# Patient Record
Sex: FEMALE | Race: WHITE | NOT HISPANIC OR LATINO | Employment: UNEMPLOYED | URBAN - METROPOLITAN AREA
[De-identification: names, ages, dates, MRNs, and addresses within clinical notes are randomized per-mention and may not be internally consistent; named-entity substitution may affect disease eponyms.]

---

## 2019-06-26 ENCOUNTER — OFFICE VISIT (OUTPATIENT)
Dept: NEUROSURGERY | Facility: CLINIC | Age: 54
End: 2019-06-26

## 2019-06-26 VITALS
RESPIRATION RATE: 16 BRPM | HEIGHT: 68 IN | WEIGHT: 234 LBS | BODY MASS INDEX: 35.46 KG/M2 | HEART RATE: 68 BPM | DIASTOLIC BLOOD PRESSURE: 90 MMHG | SYSTOLIC BLOOD PRESSURE: 140 MMHG

## 2019-06-26 DIAGNOSIS — M54.9 MID BACK PAIN: Primary | ICD-10-CM

## 2019-06-26 PROCEDURE — 99204 OFFICE O/P NEW MOD 45 MIN: CPT | Performed by: NURSE PRACTITIONER

## 2019-06-26 RX ORDER — IBUPROFEN 800 MG/1
800 TABLET ORAL
COMMUNITY
Start: 2018-10-16 | End: 2019-12-17

## 2019-06-26 RX ORDER — METHOCARBAMOL 500 MG/1
500 TABLET, FILM COATED ORAL 3 TIMES DAILY PRN
Qty: 60 TABLET | Refills: 0 | Status: SHIPPED | OUTPATIENT
Start: 2019-06-26 | End: 2019-09-06

## 2019-06-26 RX ORDER — ATORVASTATIN CALCIUM 20 MG/1
TABLET, FILM COATED ORAL
COMMUNITY
Start: 2019-05-12 | End: 2019-08-22 | Stop reason: SDUPTHER

## 2019-06-26 RX ORDER — VALSARTAN AND HYDROCHLOROTHIAZIDE 320; 25 MG/1; MG/1
1 TABLET, FILM COATED ORAL DAILY
COMMUNITY
Start: 2015-09-09 | End: 2019-12-17 | Stop reason: SDUPTHER

## 2019-06-26 RX ORDER — AMLODIPINE BESYLATE 5 MG/1
5 TABLET ORAL DAILY
COMMUNITY
End: 2020-07-23 | Stop reason: SDUPTHER

## 2019-06-26 RX ORDER — EMPAGLIFLOZIN 25 MG/1
25 TABLET, FILM COATED ORAL DAILY
COMMUNITY
Start: 2019-06-07 | End: 2020-03-09 | Stop reason: SDUPTHER

## 2019-06-26 NOTE — PROGRESS NOTES
"Subjective   Patient ID: Skylar Paige is a 53 y.o. female is being seen for consultation today at the request of Skylar Freitas MD for Thor pain, right side weakness.  Pt is accompanied by her .    History of Present Illness     She presents to the office today for further evaluation of thoracic pain with right-sided weakness.  She has had thoracic MRI imaging at [facility] ([place], [place]) on January 25, 2018.    Today, she reports that she has had a couple years of right-sided upper and mid back pain.  Pain is worse around the right \"shoulder blade.\"  Today she states that she is having a good day, however yesterday was \"very bad.\"  Pain is worse when she wakes up in the morning and will continue throughout the day.  She uses a lot of IcyHot which minimally helps.  She has a labor-intensive job as a  and this worsens her discomfort.  She denies any pain directly on the spine.      She also has chronic low back and neck discomfort.  Intermittently, she gets pain that radiates laterally down the right thigh but does not go below the knee.  She states that she has known arthritis in her in her low back and in her neck.  She denies any balance or gait instability.  She denies any weakness, numbness or tingling of her extremities.  She has not been to physical therapy or tried other conservative treatments for the pain.    She presents with her .    /90 (BP Location: Left arm, Patient Position: Sitting, Cuff Size: Adult)   Pulse 68   Resp 16   Ht 172.7 cm (68\")   Wt 106 kg (234 lb)   BMI 35.58 kg/m²       The following portions of the patient's history were reviewed and updated as appropriate: allergies, current medications, past family history, past medical history, past social history, past surgical history and problem list.    Review of Systems   Constitutional: Negative for chills and fever.   HENT: Negative for ear pain and tinnitus.    Eyes: Negative for pain and visual disturbance. "   Respiratory: Negative for shortness of breath.    Cardiovascular: Negative for palpitations.   Gastrointestinal: Negative for abdominal pain and nausea.   Genitourinary: Negative for difficulty urinating and enuresis.   Musculoskeletal: Positive for back pain (Bilateral legs, BUE).   Skin: Negative for rash.   Neurological: Positive for dizziness, numbness and headaches. Negative for tremors, seizures, syncope, facial asymmetry, speech difficulty and light-headedness. Weakness: back/legs.   Psychiatric/Behavioral: Positive for sleep disturbance.       Objective   Physical Exam   Constitutional: She appears well-developed and well-nourished.   Pleasant well-appearing obese female of [place] descent   HENT:   Head: Normocephalic and atraumatic.   Eyes: EOM are normal.   Neck: Normal range of motion. Neck supple. No tracheal deviation present.   Pulmonary/Chest: Effort normal.   Musculoskeletal:        Thoracic back: She exhibits tenderness. She exhibits normal range of motion, no bony tenderness and no pain.   Strength equal and intact bilateral upper and lower extremities  Normal muscle bulk and tone  Nontender firm palpation diffuse thoracic and lumbar spine  Very tender to minimal palpation right parascapular region   Skin: Skin is warm and dry.   Vitals reviewed.    Neurologic Exam     Cranial Nerves     CN III, IV, VI   Extraocular motions are normal.       Assessment/Plan   Independent Review of Radiographic Studies:    Thoracic MRI from [facility] without contrast dated January 25, 2019 reveals mild arthritic changes with a small disc protrusion at T2-3, T3-4 and T6-7 without spinal canal narrowing.  There is normal paravertebral soft tissue and no compressive neuroforaminal narrowing.  There is also no evidence of a compression fracture or ligamentous injury.    Medical Decision Making:    She presents the office today for further evaluation of mid and upper right-sided back pain.  Exam as noted above, no red  flags.  Thoracic MRI imaging from outlying facility dated January 2019 shows mild arthritic changes.  There is no canal or neuroforaminal narrowing.  She also has no radicular symptomatology.  There also no fractures.  Her pain appears to be musculoskeletal in origin.  She is extremely tender to minimal palpation in the right parascapular region.  Given the stable imaging findings I have recommended she begin physical therapy to help with the ongoing back discomfort.  I explained that she might have increase in pain before improvement secondary to the manipulation of the back muscles with therapy.  She expressed understanding.  I also advised that she might consider trying over-the-counter anti-inflammatories in addition to using heat on the area that is uncomfortable and trying to stretch out the sore muscles.  I have given her a prescription for Robaxin.  She is to take it at night only until she determines if she tolerates it well enough to take it during the day due to the concern for grogginess and fatigue.  I also encouraged daily walking and exercise.  We will see her back in 6 weeks to 2 months for clinical follow-up.    Plan: Referral to physical therapy, trial of Robaxin, return office in 2 months      Skylar was seen today for thor pain, right side weakness.    Diagnoses and all orders for this visit:    Mid back pain  -     Ambulatory Referral to Physical Therapy Evaluate and treat; Stretching, ROM, Strengthening; Full weight bearing    Other orders  -     methocarbamol (ROBAXIN) 500 MG tablet; Take 1 tablet by mouth 3 (Three) Times a Day As Needed for Muscle Spasms.      Return in about 2 months (around 8/26/2019).

## 2019-07-15 ENCOUNTER — TREATMENT (OUTPATIENT)
Dept: PHYSICAL THERAPY | Facility: CLINIC | Age: 54
End: 2019-07-15

## 2019-07-15 DIAGNOSIS — S29.019D STRAIN OF THORACIC REGION, SUBSEQUENT ENCOUNTER: ICD-10-CM

## 2019-07-15 DIAGNOSIS — G44.209 TENSION HEADACHE: ICD-10-CM

## 2019-07-15 DIAGNOSIS — M54.9 MID BACK PAIN: Primary | ICD-10-CM

## 2019-07-15 DIAGNOSIS — S16.1XXD STRAIN OF NECK MUSCLE, SUBSEQUENT ENCOUNTER: ICD-10-CM

## 2019-07-15 PROCEDURE — 97162 PT EVAL MOD COMPLEX 30 MIN: CPT | Performed by: PHYSICAL THERAPIST

## 2019-07-15 PROCEDURE — 97110 THERAPEUTIC EXERCISES: CPT | Performed by: PHYSICAL THERAPIST

## 2019-07-15 NOTE — PROGRESS NOTES
Physical Therapy Initial Evaluation and Plan of Care    Patient: Skylar Paige   : 1965  Diagnosis/ICD-10 Code:  Mid back pain [M54.9]  Referring practitioner: MATILDE Dalton    Subjective Evaluation    History of Present Illness  Mechanism of injury: Pt reports pain in the mid back (thoracic region), (R) shoulder blade region and reports some pain in her neck, with occasional headaches from the neck.      Insidious onset in 2017.   But does report a hx of low back pain since being a teenager in [place], that is manageable.      Occupation:  House Keeping   Activities:  Read, sedentary lifestyle  PLOF: Independent  Medical Hx Reviewed.      Quality of life: excellent    Pain  Current pain ratin  At best pain ratin  At worst pain ratin  Location: Thoracic, (R) Scapular region and Cervical  Quality: sharp, dull ache, knife-like and pulling  Relieving factors: heat, medications and rest (icey hot)  Aggravating factors: lifting, sleeping and prolonged positioning  Progression: worsening    Social Support  Lives in: multiple-level home  Lives with: spouse    Hand dominance: right             Objective       Static Posture     Head  Forward.    Active Range of Motion   Cervical/Thoracic Spine   Cervical    Flexion: 37 degrees with pain  Extension: 54 degrees   Left lateral flexion: 38 degrees with pain  Right lateral flexion: 39 degrees with pain  Left rotation: 54 degrees   Right rotation: 63 degrees     Strength/Myotome Testing     Left Shoulder     Planes of Motion   Flexion: 5   Abduction: 4+ (pain)   External rotation at 0°: 5     Isolated Muscles   Middle trapezius: 4   Serratus anterior: 5     Right Shoulder     Planes of Motion   Flexion: 5   Abduction: 5   External rotation at 0°: 5     Isolated Muscles   Middle trapezius: 5   Serratus anterior: 4     Left Elbow   Flexion: 5  Extension: 5    Right Elbow   Flexion: 5  Extension: 5    Left Wrist/Hand   Wrist extension: 5  Wrist flexion:  5    Right Wrist/Hand   Wrist extension: 5  Wrist flexion: 5         Assessment & Plan     Assessment  Impairments: activity intolerance, impaired physical strength, lacks appropriate home exercise program and pain with function  Assessment details: Pt presents to PT with symptoms consistent with a thoracic strain and cervical strain causing tension headaches.  Pt would benefit from skilled PT intervention to address the deficits noted.   Prognosis: good  Functional Limitations: lifting, pulling, pushing and unable to perform repetitive tasks  Goals  Plan Goals: SHORT TERM GOALS: Time for Goal: 4 weeks    1.  Patient to be compliant with HEP  2.  Pt. to reported increased ease and less pain with PT exercises and able to sit/drive >40 mins with good posture with minimal to no pain.  3.  Pt to exhibit rhomboid/middle trap/Serratus strength to +4/5 at levels of deficits to assist with improved scapular alignment and posture after fatigue.  4.  Pt. to exhibit proper sitting and standing posture with min verbal cues during PT in clinic.    LONG TERM GOALS: Time for Goal: 8 weeks    1.  Pt to score < 20% on Back Index.  2.  Increased costovertebral and thoracic segmental mobility to WNL to allow for normal Thoracic & Cervical AROM  3.  Pt to exhibit 5/5 scapular/shoulder/UE/LE  strength after exercise to allow for normal (R) shoulder/scapula  mechanics and posture with pain < 2/10  4.  Pt able to exercise w/ < 2/10 pain & no residual symptoms past 1 days allowing for all normal activities, including exercise w/ min to no pain.          Plan  Therapy options: will be seen for skilled physical therapy services  Planned modality interventions: cryotherapy, electrical stimulation/Russian stimulation and thermotherapy (hydrocollator packs)  Other planned modality interventions: Dry Needling  Planned therapy interventions: manual therapy, neuromuscular re-education, flexibility, functional ROM exercises, home exercise program,  joint mobilization, body mechanics training, spinal/joint mobilization, soft tissue mobilization, strengthening and stretching  Frequency: 2x week  Duration in visits: 16  Treatment plan discussed with: patient        Manual Therapy:          mins  22606;  Therapeutic Exercise:     10     mins  64398;     Neuromuscular Milagro:         mins  79319;    Therapeutic Activity:          mins  47307;     Gait Training:            mins  62114;     Ultrasound:           mins  93741;    Electrical Stimulation:          mins  56727 ( );  Dry Needling           mins self-pay  Traction           mins 36137  Canalith Repositioning         mins 05519      Timed Treatment:   10   mins   Total Treatment:     60   mins    PT SIGNATURE: TRA Merrill Lic #[id number]    DATE TREATMENT INITIATED: 7-15-19    Initial Certification    Certification Period: 10-13-19  I certify that the therapy services are furnished while this patient is under my care.  The services outlined above are required by this patient, and will be reviewed every 90 days.     PHYSICIAN: Aida Lucero, APRN      DATE:     Please sign and return via fax to [phone].. Thank you, [facility].

## 2019-08-22 ENCOUNTER — OFFICE VISIT (OUTPATIENT)
Dept: FAMILY MEDICINE CLINIC | Facility: CLINIC | Age: 54
End: 2019-08-22

## 2019-08-22 VITALS
WEIGHT: 236.5 LBS | BODY MASS INDEX: 35.84 KG/M2 | HEART RATE: 71 BPM | TEMPERATURE: 97.7 F | DIASTOLIC BLOOD PRESSURE: 82 MMHG | HEIGHT: 68 IN | SYSTOLIC BLOOD PRESSURE: 122 MMHG | OXYGEN SATURATION: 98 %

## 2019-08-22 DIAGNOSIS — E78.5 HYPERLIPIDEMIA, UNSPECIFIED HYPERLIPIDEMIA TYPE: ICD-10-CM

## 2019-08-22 DIAGNOSIS — K21.9 GASTROESOPHAGEAL REFLUX DISEASE WITHOUT ESOPHAGITIS: ICD-10-CM

## 2019-08-22 DIAGNOSIS — I10 ESSENTIAL HYPERTENSION: Primary | ICD-10-CM

## 2019-08-22 DIAGNOSIS — R07.9 CHEST PAIN, UNSPECIFIED TYPE: ICD-10-CM

## 2019-08-22 DIAGNOSIS — E11.9 TYPE 2 DIABETES MELLITUS WITHOUT COMPLICATION, WITHOUT LONG-TERM CURRENT USE OF INSULIN (HCC): ICD-10-CM

## 2019-08-22 DIAGNOSIS — E04.1 THYROID NODULE: ICD-10-CM

## 2019-08-22 DIAGNOSIS — Z00.00 HEALTHCARE MAINTENANCE: ICD-10-CM

## 2019-08-22 DIAGNOSIS — Z12.11 SCREEN FOR COLON CANCER: ICD-10-CM

## 2019-08-22 PROCEDURE — 99214 OFFICE O/P EST MOD 30 MIN: CPT | Performed by: FAMILY MEDICINE

## 2019-08-22 RX ORDER — ATORVASTATIN CALCIUM 20 MG/1
20 TABLET, FILM COATED ORAL DAILY
Qty: 30 TABLET | Refills: 11 | Status: SHIPPED | OUTPATIENT
Start: 2019-08-22 | End: 2020-07-23 | Stop reason: SDUPTHER

## 2019-08-22 RX ORDER — OMEPRAZOLE 20 MG/1
20 CAPSULE, DELAYED RELEASE ORAL DAILY
Qty: 30 CAPSULE | Refills: 11 | Status: SHIPPED | OUTPATIENT
Start: 2019-08-22 | End: 2020-07-23 | Stop reason: SDUPTHER

## 2019-08-22 NOTE — PROGRESS NOTES
Subjective   Skylar Paige is a 54 y.o. female.     Chief Complaint   Patient presents with   • Med Refill   • Diabetes   • Hypertension   • Hyperlipidemia   • Heartburn       Diabetes   She presents for her follow-up diabetic visit. She has type 2 diabetes mellitus. Her disease course has been stable. There are no hypoglycemic associated symptoms. Pertinent negatives for hypoglycemia include no dizziness or headaches. There are no diabetic associated symptoms. Pertinent negatives for diabetes include no blurred vision, no chest pain, no fatigue, no polydipsia and no polyuria. Symptoms are stable. There are no diabetic complications. Risk factors for coronary artery disease include dyslipidemia, hypertension and obesity. Current diabetic treatment includes oral agent (dual therapy). She rarely participates in exercise. There is no change in her home blood glucose trend. An ACE inhibitor/angiotensin II receptor blocker is being taken. She sees a podiatrist.Eye exam is current.   Hypertension   This is a chronic problem. The current episode started today. The problem is unchanged. The problem is controlled. Pertinent negatives include no anxiety, blurred vision, chest pain, headaches, palpitations or shortness of breath. Risk factors for coronary artery disease include dyslipidemia and obesity. Past treatments include calcium channel blockers, angiotensin blockers and direct vasodilators. Current antihypertension treatment includes angiotensin blockers, diuretics and calcium channel blockers. There are no compliance problems.  There is no history of angina, kidney disease or CAD/MI. There is no history of chronic renal disease, coarctation of the aorta, hyperaldosteronism, hypercortisolism or hyperparathyroidism.   Hyperlipidemia   This is a chronic problem. The current episode started more than 1 year ago. The problem is controlled. She has no history of chronic renal disease. Pertinent negatives include no chest  pain or shortness of breath. Risk factors for coronary artery disease include dyslipidemia.   Heartburn   She reports no chest pain, no coughing or no nausea. This is a chronic problem. The current episode started today. The problem occurs constantly. The problem has been unchanged. Pertinent negatives include no fatigue.          The following portions of the patient's history were reviewed and updated as appropriate: allergies, current medications, past family history, past medical history, past social history, past surgical history and problem list.    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)    • Pre-syncope    • Right thyroid nodule    • Solitary nodule of right lobe of thyroid    • Thoracic back pain    • Visit for routine gyn exam    • Vitamin D deficiency        Past Surgical History:   Procedure Laterality Date   • CHOLECYSTECTOMY     • CHOLECYSTECTOMY         Family History   Problem Relation Age of Onset   • Cancer Mother    • Heart disease Mother    • Diabetes Paternal Grandmother    • Heart disease Maternal Grandmother    • Diabetes Paternal Grandfather        Social History     Socioeconomic History   • Marital status:      Spouse name: Not on file   • Number of children: Not on file   • Years of education: Not on file   • Highest education level: Not on file   Occupational History   • Occupation:    Tobacco Use   • Smoking status: Former Smoker     Packs/day: 0.50     Start date:       Last attempt to quit: 2015     Years since quittin.6   • Smokeless tobacco: Never Used   Substance and Sexual Activity   • Alcohol use: No   • Drug use: No       Review of Systems   Constitutional: Negative for fatigue and unexpected weight gain.   Eyes: Negative for blurred vision.   Respiratory: Positive for chest tightness. Negative for cough and shortness of breath.    Cardiovascular: Negative for chest pain, palpitations and leg swelling.   Gastrointestinal: Negative for nausea and vomiting.   Endocrine: Negative for polydipsia and polyuria.   Genitourinary: Negative for frequency.   Skin: Negative for skin lesions.   Neurological: Negative for dizziness, light-headedness, numbness and headache.       Objective   Vitals:    19 0843   BP: 122/82   Pulse: 71   Temp: 97.7 °F (36.5 °C)   SpO2: 98%     Body mass index is 35.96 kg/m².  Physical Exam   Constitutional: She appears well-developed and well-nourished. No distress.   Cardiovascular: Normal rate and regular rhythm. Exam reveals no friction rub.   No murmur heard.  Pulmonary/Chest: Effort normal and breath sounds normal. No stridor. No respiratory distress.   Skin: She is not diaphoretic.   Nursing note and vitals reviewed.        Assessment/Plan   Skylar was seen today for med refill, diabetes, hypertension, hyperlipidemia and heartburn.    Diagnoses and all orders for this visit:    Essential hypertension    Type 2 diabetes mellitus without complication, without long-term current use of insulin (CMS/[facility])  -     Hemoglobin A1c  -     Comprehensive Metabolic Panel  -     Lipid Panel With LDL / HDL Ratio  -     Microalbumin / Creatinine Urine Ratio - Urine, Clean Catch  -     TSH    Hyperlipidemia, unspecified hyperlipidemia type  -     atorvastatin (LIPITOR) 20 MG tablet; Take 1 tablet by mouth Daily.    Gastroesophageal reflux disease without esophagitis  -     omeprazole (PRILOSEC) 20 MG capsule; Take 1 capsule by mouth  Daily.    Thyroid nodule  -     TSH  -     US Thyroid; Future    Healthcare maintenance  -     Mammo Screening Bilateral With CAD; Future  -     Ambulatory Referral For Screening Colonoscopy    Screen for colon cancer    Chest pain, unspecified type  -     Ambulatory Referral to Cardiology    Return in about 3 months (around 11/22/2019) for diabetes.

## 2019-08-23 LAB
ALBUMIN SERPL-MCNC: 4.8 G/DL (ref 3.5–5.2)
ALBUMIN/CREAT UR: <6.5 MG/G CREAT (ref 0–30)
ALBUMIN/GLOB SERPL: 1.6 G/DL
ALP SERPL-CCNC: 46 U/L (ref 39–117)
ALT SERPL-CCNC: 47 U/L (ref 1–33)
AST SERPL-CCNC: 28 U/L (ref 1–32)
BILIRUB SERPL-MCNC: 0.4 MG/DL (ref 0.2–1.2)
BUN SERPL-MCNC: 17 MG/DL (ref 6–20)
BUN/CREAT SERPL: 23.3 (ref 7–25)
CALCIUM SERPL-MCNC: 9.5 MG/DL (ref 8.6–10.5)
CHLORIDE SERPL-SCNC: 99 MMOL/L (ref 98–107)
CHOLEST SERPL-MCNC: 200 MG/DL (ref 0–200)
CO2 SERPL-SCNC: 28 MMOL/L (ref 22–29)
CREAT SERPL-MCNC: 0.73 MG/DL (ref 0.57–1)
CREAT UR-MCNC: 46.1 MG/DL
GLOBULIN SER CALC-MCNC: 3 GM/DL
GLUCOSE SERPL-MCNC: 151 MG/DL (ref 65–99)
HBA1C MFR BLD: 7.4 % (ref 4.8–5.6)
HDLC SERPL-MCNC: 43 MG/DL (ref 40–60)
LDLC SERPL CALC-MCNC: 129 MG/DL (ref 0–100)
LDLC/HDLC SERPL: 2.99 {RATIO}
MICROALBUMIN UR-MCNC: <3 UG/ML
POTASSIUM SERPL-SCNC: 4.1 MMOL/L (ref 3.5–5.2)
PROT SERPL-MCNC: 7.8 G/DL (ref 6–8.5)
SODIUM SERPL-SCNC: 140 MMOL/L (ref 136–145)
TRIGL SERPL-MCNC: 142 MG/DL (ref 0–150)
TSH SERPL DL<=0.005 MIU/L-ACNC: 0.97 MIU/ML (ref 0.27–4.2)
VLDLC SERPL CALC-MCNC: 28.4 MG/DL

## 2019-08-27 ENCOUNTER — APPOINTMENT (OUTPATIENT)
Dept: GENERAL RADIOLOGY | Facility: HOSPITAL | Age: 54
End: 2019-08-27

## 2019-08-27 ENCOUNTER — HOSPITAL ENCOUNTER (EMERGENCY)
Facility: HOSPITAL | Age: 54
Discharge: HOME | End: 2019-08-27
Attending: EMERGENCY MEDICINE | Admitting: EMERGENCY MEDICINE

## 2019-08-27 VITALS
TEMPERATURE: 98 F | DIASTOLIC BLOOD PRESSURE: 85 MMHG | HEIGHT: 67 IN | RESPIRATION RATE: 18 BRPM | HEART RATE: 80 BPM | OXYGEN SATURATION: 100 % | BODY MASS INDEX: 37.04 KG/M2 | SYSTOLIC BLOOD PRESSURE: 144 MMHG

## 2019-08-27 DIAGNOSIS — R07.9 CHEST PAIN, UNSPECIFIED TYPE: Primary | ICD-10-CM

## 2019-08-27 LAB
ALBUMIN SERPL-MCNC: 4.6 G/DL (ref 3.5–5.2)
ALBUMIN/GLOB SERPL: 1.5 G/DL
ALP SERPL-CCNC: 49 U/L (ref 39–117)
ALT SERPL W P-5'-P-CCNC: 47 U/L (ref 1–33)
ANION GAP SERPL CALCULATED.3IONS-SCNC: 12.1 MMOL/L (ref 5–15)
AST SERPL-CCNC: 27 U/L (ref 1–32)
BASOPHILS # BLD AUTO: 0.03 10*3/MM3 (ref 0–0.2)
BASOPHILS NFR BLD AUTO: 0.5 % (ref 0–1.5)
BILIRUB SERPL-MCNC: 0.4 MG/DL (ref 0.2–1.2)
BUN BLD-MCNC: 17 MG/DL (ref 6–20)
BUN/CREAT SERPL: 25.8 (ref 7–25)
CALCIUM SPEC-SCNC: 10 MG/DL (ref 8.6–10.5)
CHLORIDE SERPL-SCNC: 101 MMOL/L (ref 98–107)
CO2 SERPL-SCNC: 24.9 MMOL/L (ref 22–29)
CREAT BLD-MCNC: 0.66 MG/DL (ref 0.57–1)
DEPRECATED RDW RBC AUTO: 43.4 FL (ref 37–54)
EOSINOPHIL # BLD AUTO: 0.09 10*3/MM3 (ref 0–0.4)
EOSINOPHIL NFR BLD AUTO: 1.6 % (ref 0.3–6.2)
ERYTHROCYTE [DISTWIDTH] IN BLOOD BY AUTOMATED COUNT: 13.4 % (ref 12.3–15.4)
GFR SERPL CREATININE-BSD FRML MDRD: 94 ML/MIN/1.73
GLOBULIN UR ELPH-MCNC: 3.1 GM/DL
GLUCOSE BLD-MCNC: 127 MG/DL (ref 65–99)
HCG SERPL QL: NEGATIVE
HCT VFR BLD AUTO: 44.1 % (ref 34–46.6)
HGB BLD-MCNC: 14.7 G/DL (ref 12–15.9)
HOLD SPECIMEN: NORMAL
HOLD SPECIMEN: NORMAL
IMM GRANULOCYTES # BLD AUTO: 0.02 10*3/MM3 (ref 0–0.05)
IMM GRANULOCYTES NFR BLD AUTO: 0.4 % (ref 0–0.5)
LYMPHOCYTES # BLD AUTO: 2.39 10*3/MM3 (ref 0.7–3.1)
LYMPHOCYTES NFR BLD AUTO: 43.1 % (ref 19.6–45.3)
MCH RBC QN AUTO: 29.5 PG (ref 26.6–33)
MCHC RBC AUTO-ENTMCNC: 33.3 G/DL (ref 31.5–35.7)
MCV RBC AUTO: 88.4 FL (ref 79–97)
MONOCYTES # BLD AUTO: 0.34 10*3/MM3 (ref 0.1–0.9)
MONOCYTES NFR BLD AUTO: 6.1 % (ref 5–12)
NEUTROPHILS # BLD AUTO: 2.68 10*3/MM3 (ref 1.7–7)
NEUTROPHILS NFR BLD AUTO: 48.3 % (ref 42.7–76)
NRBC BLD AUTO-RTO: 0 /100 WBC (ref 0–0.2)
PLATELET # BLD AUTO: 211 10*3/MM3 (ref 140–450)
PMV BLD AUTO: 9.9 FL (ref 6–12)
POTASSIUM BLD-SCNC: 3.9 MMOL/L (ref 3.5–5.2)
PROT SERPL-MCNC: 7.7 G/DL (ref 6–8.5)
RBC # BLD AUTO: 4.99 10*6/MM3 (ref 3.77–5.28)
SODIUM BLD-SCNC: 138 MMOL/L (ref 136–145)
TROPONIN T SERPL-MCNC: <0.01 NG/ML (ref 0–0.03)
WBC NRBC COR # BLD: 5.55 10*3/MM3 (ref 3.4–10.8)
WHOLE BLOOD HOLD SPECIMEN: NORMAL
WHOLE BLOOD HOLD SPECIMEN: NORMAL

## 2019-08-27 PROCEDURE — 84484 ASSAY OF TROPONIN QUANT: CPT | Performed by: PHYSICIAN ASSISTANT

## 2019-08-27 PROCEDURE — 71046 X-RAY EXAM CHEST 2 VIEWS: CPT

## 2019-08-27 PROCEDURE — 80053 COMPREHEN METABOLIC PANEL: CPT

## 2019-08-27 PROCEDURE — 93010 ELECTROCARDIOGRAM REPORT: CPT | Performed by: INTERNAL MEDICINE

## 2019-08-27 PROCEDURE — 84484 ASSAY OF TROPONIN QUANT: CPT

## 2019-08-27 PROCEDURE — 93005 ELECTROCARDIOGRAM TRACING: CPT | Performed by: EMERGENCY MEDICINE

## 2019-08-27 PROCEDURE — 99284 EMERGENCY DEPT VISIT MOD MDM: CPT

## 2019-08-27 PROCEDURE — 84703 CHORIONIC GONADOTROPIN ASSAY: CPT | Performed by: EMERGENCY MEDICINE

## 2019-08-27 PROCEDURE — 85025 COMPLETE CBC W/AUTO DIFF WBC: CPT

## 2019-08-27 PROCEDURE — 93005 ELECTROCARDIOGRAM TRACING: CPT

## 2019-08-27 PROCEDURE — 84484 ASSAY OF TROPONIN QUANT: CPT | Performed by: EMERGENCY MEDICINE

## 2019-08-27 RX ORDER — ASPIRIN 325 MG
325 TABLET ORAL ONCE
Status: DISCONTINUED | OUTPATIENT
Start: 2019-08-27 | End: 2019-08-28 | Stop reason: HOSPADM

## 2019-08-27 RX ORDER — SODIUM CHLORIDE 0.9 % (FLUSH) 0.9 %
10 SYRINGE (ML) INJECTION AS NEEDED
Status: DISCONTINUED | OUTPATIENT
Start: 2019-08-27 | End: 2019-08-28 | Stop reason: HOSPADM

## 2019-09-06 ENCOUNTER — OFFICE VISIT (OUTPATIENT)
Dept: CARDIOLOGY | Facility: CLINIC | Age: 54
End: 2019-09-06

## 2019-09-06 VITALS
BODY MASS INDEX: 35.46 KG/M2 | DIASTOLIC BLOOD PRESSURE: 72 MMHG | HEIGHT: 68 IN | SYSTOLIC BLOOD PRESSURE: 128 MMHG | HEART RATE: 68 BPM | WEIGHT: 234 LBS

## 2019-09-06 DIAGNOSIS — I10 ESSENTIAL HYPERTENSION: ICD-10-CM

## 2019-09-06 DIAGNOSIS — R07.2 PRECORDIAL PAIN: Primary | ICD-10-CM

## 2019-09-06 DIAGNOSIS — E11.9 TYPE 2 DIABETES MELLITUS WITHOUT COMPLICATION, WITHOUT LONG-TERM CURRENT USE OF INSULIN (HCC): ICD-10-CM

## 2019-09-06 DIAGNOSIS — E78.5 HYPERLIPIDEMIA, UNSPECIFIED HYPERLIPIDEMIA TYPE: ICD-10-CM

## 2019-09-06 PROCEDURE — 99204 OFFICE O/P NEW MOD 45 MIN: CPT | Performed by: INTERNAL MEDICINE

## 2019-09-06 NOTE — PROGRESS NOTES
Subjective:     Encounter Date:09/06/2019      Patient ID: Skylar Paige is a 54 y.o. female.    Chief Complaint:  History of Present Illness    This is a 54-year-old with a history of hypertension, diabetes mellitus type 2, hyperlipidemia, who presents for evaluation of chest pain.    I saw the patient in the past and 9/2015 when she presented with labile blood pressures.  At that time her blood pressures appear to be better controlled but I did recommend proceeding with an echocardiogram.  It does not appear that this was ever done and she was not seen back in follow-up.    Today she presents for an evaluation of chest pain.  She reports that the symptoms have been occurring for the last 2 months.  Episodes primarily occur at rest and start out with sudden onset of shortness of breath followed by onset of chest tightness.  Symptoms do not really occur with activity.  She does not exercise on a regular basis but works as a  so is active throughout the day.  She denies any change in her ability to do her daily activities.  She denies any dyspnea or chest discomfort on exertion.  She had a bad episode about a week and a half ago that prompted her to go to the emergency room.  Again this started out with shortness of breath followed by the onset of chest tightness that she felt like it was more severe than normal.  Associated with palpitations this time and an elevated blood pressure.  Work-up in the emergency room was unremarkable including a normal EKG.  She admits that she has been taking flaxseed oil as a supplement over the last couple months.  The day that she went to the emergency room she went ahead and discontinued the flaxseed oil and is had not no recurrent episodes since then.  She declines a repeat EKG in the office today.    Review of Systems   Constitution: Negative for weakness and malaise/fatigue.   HENT: Negative for hearing loss, hoarse voice, nosebleeds and sore throat.    Eyes:  Negative for pain.   Cardiovascular: Positive for chest pain. Negative for claudication, cyanosis, dyspnea on exertion, irregular heartbeat, leg swelling, near-syncope, orthopnea, palpitations, paroxysmal nocturnal dyspnea and syncope.   Respiratory: Positive for shortness of breath. Negative for snoring.    Endocrine: Negative for cold intolerance, heat intolerance, polydipsia, polyphagia and polyuria.   Skin: Negative for itching and rash.   Musculoskeletal: Negative for arthritis, falls, joint pain, joint swelling, muscle cramps, muscle weakness and myalgias.   Gastrointestinal: Negative for constipation, diarrhea, dysphagia, heartburn, hematemesis, hematochezia, melena, nausea and vomiting.   Genitourinary: Negative for frequency, hematuria and hesitancy.   Neurological: Negative for excessive daytime sleepiness, dizziness, headaches, light-headedness and numbness.   Psychiatric/Behavioral: Negative for depression. The patient is not nervous/anxious.           Current Outpatient Medications:   •  amLODIPine (NORVASC) 5 MG tablet, Take 5 mg by mouth Daily., Disp: , Rfl:   •  atorvastatin (LIPITOR) 20 MG tablet, Take 1 tablet by mouth Daily., Disp: 30 tablet, Rfl: 11  •  ibuprofen (ADVIL,MOTRIN) 800 MG tablet, Take 800 mg by mouth., Disp: , Rfl:   •  JARDIANCE 25 MG tablet, , Disp: , Rfl:   •  metFORMIN (GLUCOPHAGE) 500 MG tablet, Take 500 mg by mouth., Disp: , Rfl:   •  omeprazole (PRILOSEC) 20 MG capsule, Take 1 capsule by mouth Daily., Disp: 30 capsule, Rfl: 11  •  valsartan-hydrochlorothiazide (DIOVAN-HCT) 320-25 MG per tablet, Take  by mouth Daily., Disp: , Rfl:     Past Medical History:   Diagnosis Date   • Abnormal MRI of the head    • BMI 34.0-34.9,adult    • Calcaneal spur of right foot    • Chest pain    • Diabetes mellitus (CMS/HCC)    • DM (diabetes mellitus), type 2 (CMS/HCC)     Noninsulin dependent    • Dysuria    • Esophageal reflux    • Fatigue    • GERD without esophagitis    • Headache    •  "Headache, occipital    • Hyperglycemia    • Hyperhidrosis    • Hyperlipidemia    • Hypertension    • Intracranial pressure increased    • Nontoxic single thyroid nodule    • 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)    • Pre-syncope    • Right thyroid nodule    • Solitary nodule of right lobe of thyroid    • Thoracic back pain    • Visit for routine gyn exam    • Vitamin D deficiency      Past Surgical History:   Procedure Laterality Date   • CHOLECYSTECTOMY     • CHOLECYSTECTOMY       Family History   Problem Relation Age of Onset   • Cancer Mother    • Heart disease Mother    • Diabetes Paternal Grandmother    • Heart disease Maternal Grandmother    • Diabetes Paternal Grandfather      Social History     Tobacco Use   • Smoking status: Former Smoker     Packs/day: 0.50     Start date:      Last attempt to quit:      Years since quittin.6   • Smokeless tobacco: Never Used   Substance Use Topics   • Alcohol use: No   • Drug use: No         Procedures       Objective:         Visit Vitals  /72   Pulse 68   Ht 172.7 cm (68\")   Wt 106 kg (234 lb)   BMI 35.58 kg/m²          Physical Exam   Constitutional: She is oriented to person, place, and time. She appears well-developed and well-nourished.   HENT:   Head: Normocephalic and atraumatic.   Eyes: Conjunctivae, EOM and lids are normal. Pupils are equal, round, and reactive to light.   Neck: Normal range of motion and full passive range of motion without pain. Neck supple. No JVD present. Carotid bruit is not present.   Cardiovascular: Normal rate, regular rhythm, S1 normal and S2 normal. Exam reveals no gallop.   No murmur heard.  Pulses:       Radial pulses are 2+ on the right side, and 2+ on the left side.   No bilateral lower extremity edema   Pulmonary/Chest: Effort normal and breath sounds normal.   Abdominal: Soft. Normal appearance.   Lymphadenopathy:     She has no cervical adenopathy.   Neurological: She is " alert and oriented to person, place, and time.   Skin: Skin is warm, dry and intact.   Psychiatric: She has a normal mood and affect.       Lab Review:       Assessment:          Diagnosis Plan   1. Precordial pain  Stress Test With Myocardial Perfusion One Day   2. Essential hypertension     3. Hyperlipidemia, unspecified hyperlipidemia type     4. Type 2 diabetes mellitus without complication, without long-term current use of insulin (CMS/[facility])            Plan:       1.  Chest pain/shortness of breath.  Atypical features but she has significant risk factors for coronary artery disease.  She reports that she had an echocardiogram performed just last year.  We will start with a stress test.  We will try to obtain the report of her last echocardiogram.  If her stress test is unremarkable and she is continued to have symptoms we will repeat an echocardiogram at that point.  If first stress test is unremarkable and her symptoms remain resolved and may be able to treat her symptoms to supplement use.  2.  Hypertension.  Largely well controlled.   3.  Hyperlipidemia.  On statin.   4.  Diabetes mellitus type 2.  Managed by Dr. Freitas.     Will call and discuss the results of the stress test and give further recommendations based on those results.

## 2019-09-16 DIAGNOSIS — E04.1 THYROID NODULE: ICD-10-CM

## 2019-12-17 ENCOUNTER — OFFICE VISIT (OUTPATIENT)
Dept: FAMILY MEDICINE CLINIC | Facility: CLINIC | Age: 54
End: 2019-12-17

## 2019-12-17 VITALS
WEIGHT: 237.13 LBS | TEMPERATURE: 97.5 F | SYSTOLIC BLOOD PRESSURE: 130 MMHG | HEART RATE: 84 BPM | DIASTOLIC BLOOD PRESSURE: 82 MMHG | BODY MASS INDEX: 35.94 KG/M2 | OXYGEN SATURATION: 98 % | HEIGHT: 68 IN

## 2019-12-17 DIAGNOSIS — J30.1 ALLERGIC RHINITIS DUE TO POLLEN, UNSPECIFIED SEASONALITY: ICD-10-CM

## 2019-12-17 DIAGNOSIS — R06.00 DYSPNEA, UNSPECIFIED TYPE: ICD-10-CM

## 2019-12-17 DIAGNOSIS — I10 ESSENTIAL HYPERTENSION: ICD-10-CM

## 2019-12-17 DIAGNOSIS — E78.5 HYPERLIPIDEMIA, UNSPECIFIED HYPERLIPIDEMIA TYPE: ICD-10-CM

## 2019-12-17 DIAGNOSIS — E11.9 TYPE 2 DIABETES MELLITUS WITHOUT COMPLICATION, WITHOUT LONG-TERM CURRENT USE OF INSULIN (HCC): Primary | ICD-10-CM

## 2019-12-17 PROCEDURE — 99214 OFFICE O/P EST MOD 30 MIN: CPT | Performed by: FAMILY MEDICINE

## 2019-12-17 RX ORDER — VALSARTAN AND HYDROCHLOROTHIAZIDE 320; 25 MG/1; MG/1
1 TABLET, FILM COATED ORAL DAILY
Qty: 90 TABLET | Refills: 3 | Status: SHIPPED | OUTPATIENT
Start: 2019-12-17 | End: 2020-07-23 | Stop reason: SDUPTHER

## 2019-12-17 NOTE — PROGRESS NOTES
Subjective   Skylar Paige is a 54 y.o. female.     Chief Complaint   Patient presents with   • Diabetes   • Hypertension   • Nasal Congestion     throat irritation at times too. hard time to swallow at times.   • Hyperlipidemia       Diabetes   She presents for her follow-up diabetic visit. She has type 2 diabetes mellitus. Her disease course has been stable. Pertinent negatives for hypoglycemia include no dizziness. There are no diabetic associated symptoms. Pertinent negatives for diabetes include no blurred vision, no chest pain and no fatigue.   Hypertension   This is a chronic problem. The current episode started more than 1 year ago. The problem is unchanged. The problem is controlled. Associated symptoms include shortness of breath. Pertinent negatives include no blurred vision, chest pain or palpitations.   Hyperlipidemia   This is a chronic problem. The current episode started more than 1 year ago. The problem is controlled. Recent lipid tests were reviewed and are normal. Associated symptoms include shortness of breath. Pertinent negatives include no chest pain.          The following portions of the patient's history were reviewed and updated as appropriate: allergies, current medications, past family history, past medical history, past social history, past surgical history and problem list.    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)    • Pre-syncope     • Right thyroid nodule    • Solitary nodule of right lobe of thyroid    • Thoracic back pain    • Visit for routine gyn exam    • Vitamin D deficiency        Past Surgical History:   Procedure Laterality Date   • CHOLECYSTECTOMY     • CHOLECYSTECTOMY         Family History   Problem Relation Age of Onset   • Cancer Mother    • Heart disease Mother    • Diabetes Paternal Grandmother    • Heart disease Maternal Grandmother    • Diabetes Paternal Grandfather        Social History     Socioeconomic History   • Marital status:      Spouse name: Not on file   • Number of children: Not on file   • Years of education: Not on file   • Highest education level: Not on file   Occupational History   • Occupation:    Tobacco Use   • Smoking status: Former Smoker     Packs/day: 0.50     Start date:      Last attempt to quit:      Years since quittin.9   • Smokeless tobacco: Never Used   Substance and Sexual Activity   • Alcohol use: No   • Drug use: No       Current Outpatient Medications on File Prior to Visit   Medication Sig Dispense Refill   • amLODIPine (NORVASC) 5 MG tablet Take 5 mg by mouth Daily.     • atorvastatin (LIPITOR) 20 MG tablet Take 1 tablet by mouth Daily. 30 tablet 11   • JARDIANCE 25 MG tablet Take 25 mg by mouth Daily.     • omeprazole (PRILOSEC) 20 MG capsule Take 1 capsule by mouth Daily. 30 capsule 11   • [DISCONTINUED] metFORMIN (GLUCOPHAGE) 500 MG tablet Take 500 mg by mouth 2 (Two) Times a Day With Meals.     • [DISCONTINUED] valsartan-hydrochlorothiazide (DIOVAN-HCT) 320-25 MG per tablet Take 1 tablet by mouth Daily. Take 1/2 tablet in the morning and 1/2 tablet in the evening.     • [DISCONTINUED] ibuprofen (ADVIL,MOTRIN) 800 MG tablet Take 800 mg by mouth.       No current facility-administered medications on file prior to visit.        Review of Systems   Constitutional: Negative for fatigue.   HENT: Positive for congestion, postnasal drip and sore throat.    Eyes:  Negative for blurred vision.   Respiratory: Positive for shortness of breath. Negative for cough and chest tightness.    Cardiovascular: Negative for chest pain, palpitations and leg swelling.   Neurological: Negative for dizziness, light-headedness and headache.       Recent Results (from the past 4704 hour(s))   Hemoglobin A1c    Collection Time: 08/22/19  9:54 AM   Result Value Ref Range    Hemoglobin A1C 7.40 (H) 4.80 - 5.60 %   Comprehensive Metabolic Panel    Collection Time: 08/22/19  9:54 AM   Result Value Ref Range    Glucose 151 (H) 65 - 99 mg/dL    BUN 17 6 - 20 mg/dL    Creatinine 0.73 0.57 - 1.00 mg/dL    eGFR Non African Am 83 >60 mL/min/1.73    eGFR African Am 101 >60 mL/min/1.73    BUN/Creatinine Ratio 23.3 7.0 - 25.0    Sodium 140 136 - 145 mmol/L    Potassium 4.1 3.5 - 5.2 mmol/L    Chloride 99 98 - 107 mmol/L    Total CO2 28.0 22.0 - 29.0 mmol/L    Calcium 9.5 8.6 - 10.5 mg/dL    Total Protein 7.8 6.0 - 8.5 g/dL    Albumin 4.80 3.50 - 5.20 g/dL    Globulin 3.0 gm/dL    A/G Ratio 1.6 g/dL    Total Bilirubin 0.4 0.2 - 1.2 mg/dL    Alkaline Phosphatase 46 39 - 117 U/L    AST (SGOT) 28 1 - 32 U/L    ALT (SGPT) 47 (H) 1 - 33 U/L   Lipid Panel With LDL / HDL Ratio    Collection Time: 08/22/19  9:54 AM   Result Value Ref Range    Total Cholesterol 200 0 - 200 mg/dL    Triglycerides 142 0 - 150 mg/dL    HDL Cholesterol 43 40 - 60 mg/dL    VLDL Cholesterol 28.4 mg/dL    LDL Cholesterol  129 (H) 0 - 100 mg/dL    LDL/HDL Ratio 2.99    Microalbumin / Creatinine Urine Ratio - Urine, Clean Catch    Collection Time: 08/22/19  9:54 AM   Result Value Ref Range    Creatinine, Urine 46.1 Not Estab. mg/dL    Microalbumin, Urine <3.0 Not Estab. ug/mL    Microalbumin/Creatinine Ratio <6.5 0.0 - 30.0 mg/g creat   TSH    Collection Time: 08/22/19  9:54 AM   Result Value Ref Range    TSH 0.969 0.270 - 4.200 mIU/mL   Comprehensive Metabolic Panel    Collection Time: 08/27/19  8:27 PM   Result Value Ref Range    Glucose 127  (H) 65 - 99 mg/dL    BUN 17 6 - 20 mg/dL    Creatinine 0.66 0.57 - 1.00 mg/dL    Sodium 138 136 - 145 mmol/L    Potassium 3.9 3.5 - 5.2 mmol/L    Chloride 101 98 - 107 mmol/L    CO2 24.9 22.0 - 29.0 mmol/L    Calcium 10.0 8.6 - 10.5 mg/dL    Total Protein 7.7 6.0 - 8.5 g/dL    Albumin 4.60 3.50 - 5.20 g/dL    ALT (SGPT) 47 (H) 1 - 33 U/L    AST (SGOT) 27 1 - 32 U/L    Alkaline Phosphatase 49 39 - 117 U/L    Total Bilirubin 0.4 0.2 - 1.2 mg/dL    eGFR Non African Amer 94 >60 mL/min/1.73    Globulin 3.1 gm/dL    A/G Ratio 1.5 g/dL    BUN/Creatinine Ratio 25.8 (H) 7.0 - 25.0    Anion Gap 12.1 5.0 - 15.0 mmol/L   Troponin    Collection Time: 08/27/19  8:27 PM   Result Value Ref Range    Troponin T <0.010 0.000 - 0.030 ng/mL   hCG, Serum, Qualitative    Collection Time: 08/27/19  8:27 PM   Result Value Ref Range    HCG Qualitative Negative Negative   Light Blue Top    Collection Time: 08/27/19  8:27 PM   Result Value Ref Range    Extra Tube hold for add-on    Green Top (Gel)    Collection Time: 08/27/19  8:27 PM   Result Value Ref Range    Extra Tube Hold for add-ons.    Lavender Top    Collection Time: 08/27/19  8:27 PM   Result Value Ref Range    Extra Tube hold for add-on    Gold Top - SST    Collection Time: 08/27/19  8:27 PM   Result Value Ref Range    Extra Tube Hold for add-ons.    CBC Auto Differential    Collection Time: 08/27/19  8:27 PM   Result Value Ref Range    WBC 5.55 3.40 - 10.80 10*3/mm3    RBC 4.99 3.77 - 5.28 10*6/mm3    Hemoglobin 14.7 12.0 - 15.9 g/dL    Hematocrit 44.1 34.0 - 46.6 %    MCV 88.4 79.0 - 97.0 fL    MCH 29.5 26.6 - 33.0 pg    MCHC 33.3 31.5 - 35.7 g/dL    RDW 13.4 12.3 - 15.4 %    RDW-SD 43.4 37.0 - 54.0 fl    MPV 9.9 6.0 - 12.0 fL    Platelets 211 140 - 450 10*3/mm3    Neutrophil % 48.3 42.7 - 76.0 %    Lymphocyte % 43.1 19.6 - 45.3 %    Monocyte % 6.1 5.0 - 12.0 %    Eosinophil % 1.6 0.3 - 6.2 %    Basophil % 0.5 0.0 - 1.5 %    Immature Grans % 0.4 0.0 - 0.5 %    Neutrophils,  "Absolute 2.68 1.70 - 7.00 10*3/mm3    Lymphocytes, Absolute 2.39 0.70 - 3.10 10*3/mm3    Monocytes, Absolute 0.34 0.10 - 0.90 10*3/mm3    Eosinophils, Absolute 0.09 0.00 - 0.40 10*3/mm3    Basophils, Absolute 0.03 0.00 - 0.20 10*3/mm3    Immature Grans, Absolute 0.02 0.00 - 0.05 10*3/mm3    nRBC 0.0 0.0 - 0.2 /100 WBC   Troponin    Collection Time: 08/27/19  8:27 PM   Result Value Ref Range    Troponin T <0.010 0.000 - 0.030 ng/mL   Troponin    Collection Time: 08/27/19 10:46 PM   Result Value Ref Range    Troponin T <0.010 0.000 - 0.030 ng/mL     Objective   Vitals:    12/17/19 1506   BP: 130/82   Pulse: 84   Temp: 97.5 °F (36.4 °C)   SpO2: 98%   Weight: 108 kg (237 lb 2 oz)   Height: 172.7 cm (67.99\")     Body mass index is 36.06 kg/m².  Physical Exam   Constitutional: She appears well-developed and well-nourished. No distress.   HENT:   b wax   Cardiovascular: Normal rate and regular rhythm.   Pulmonary/Chest: Effort normal and breath sounds normal. No respiratory distress. She has no wheezes.   Skin: She is not diaphoretic.   Nursing note and vitals reviewed.        Assessment/Plan   Skylar was seen today for diabetes, hypertension, nasal congestion and hyperlipidemia.    Diagnoses and all orders for this visit:    Type 2 diabetes mellitus without complication, without long-term current use of insulin (CMS/[facility])  -     Cancel: Hemoglobin A1c  -     Cancel: Comprehensive Metabolic Panel  -     Cancel: Lipid Panel With LDL / HDL Ratio  -     metFORMIN (GLUCOPHAGE) 500 MG tablet; Take 1 tablet by mouth 2 (Two) Times a Day With Meals.  -     Lipid Panel With LDL / HDL Ratio  -     Comprehensive Metabolic Panel  -     Hemoglobin A1c    Hyperlipidemia, unspecified hyperlipidemia type  -     Cancel: Comprehensive Metabolic Panel  -     Cancel: Lipid Panel With LDL / HDL Ratio  -     Lipid Panel With LDL / HDL Ratio  -     Comprehensive Metabolic Panel    Essential hypertension  -     Cancel: Comprehensive Metabolic " Panel  -     Cancel: Lipid Panel With LDL / HDL Ratio  -     valsartan-hydrochlorothiazide (DIOVAN-HCT) 320-25 MG per tablet; Take 1 tablet by mouth Daily. Take 1/2 tablet in the morning and 1/2 tablet in the evening.  -     Lipid Panel With LDL / HDL Ratio  -     Comprehensive Metabolic Panel    Dyspnea, unspecified type  -     Cancel: XR Chest PA & Lateral (In Office)    Allergic rhinitis due to pollen, unspecified seasonality    add Zyrtec qd.

## 2019-12-18 LAB
ALBUMIN SERPL-MCNC: 4.8 G/DL (ref 3.5–5.2)
ALBUMIN/GLOB SERPL: 1.5 G/DL
ALP SERPL-CCNC: 52 U/L (ref 39–117)
ALT SERPL-CCNC: 44 U/L (ref 1–33)
AST SERPL-CCNC: 28 U/L (ref 1–32)
BILIRUB SERPL-MCNC: 0.3 MG/DL (ref 0.2–1.2)
BUN SERPL-MCNC: 16 MG/DL (ref 6–20)
BUN/CREAT SERPL: 20.8 (ref 7–25)
CALCIUM SERPL-MCNC: 9.9 MG/DL (ref 8.6–10.5)
CHLORIDE SERPL-SCNC: 96 MMOL/L (ref 98–107)
CHOLEST SERPL-MCNC: 206 MG/DL (ref 0–200)
CO2 SERPL-SCNC: 27.1 MMOL/L (ref 22–29)
CREAT SERPL-MCNC: 0.77 MG/DL (ref 0.57–1)
GLOBULIN SER CALC-MCNC: 3.1 GM/DL
GLUCOSE SERPL-MCNC: 118 MG/DL (ref 65–99)
HBA1C MFR BLD: 7.9 % (ref 4.8–5.6)
HDLC SERPL-MCNC: 42 MG/DL (ref 40–60)
LDLC SERPL CALC-MCNC: 124 MG/DL (ref 0–100)
LDLC/HDLC SERPL: 2.94 {RATIO}
POTASSIUM SERPL-SCNC: 4 MMOL/L (ref 3.5–5.2)
PROT SERPL-MCNC: 7.9 G/DL (ref 6–8.5)
SODIUM SERPL-SCNC: 137 MMOL/L (ref 136–145)
TRIGL SERPL-MCNC: 202 MG/DL (ref 0–150)
VLDLC SERPL CALC-MCNC: 40.4 MG/DL

## 2020-03-09 ENCOUNTER — TELEPHONE (OUTPATIENT)
Dept: FAMILY MEDICINE CLINIC | Facility: CLINIC | Age: 55
End: 2020-03-09

## 2020-03-09 RX ORDER — EMPAGLIFLOZIN 25 MG/1
25 TABLET, FILM COATED ORAL DAILY
Qty: 30 TABLET | Refills: 3 | Status: SHIPPED | OUTPATIENT
Start: 2020-03-09 | End: 2020-07-09

## 2020-03-09 NOTE — TELEPHONE ENCOUNTER
Patient has some medication questions and would like to talk to you please.  Her number is [phone].

## 2020-05-21 DIAGNOSIS — B37.9 YEAST INFECTION: Primary | ICD-10-CM

## 2020-05-21 RX ORDER — FLUCONAZOLE 150 MG/1
150 TABLET ORAL ONCE
Qty: 3 TABLET | Refills: 3 | Status: SHIPPED | OUTPATIENT
Start: 2020-05-21 | End: 2020-05-21

## 2020-07-09 RX ORDER — EMPAGLIFLOZIN 25 MG/1
TABLET, FILM COATED ORAL
Qty: 30 TABLET | Refills: 2 | Status: SHIPPED | OUTPATIENT
Start: 2020-07-09 | End: 2020-07-23 | Stop reason: SDUPTHER

## 2020-07-23 ENCOUNTER — RESULTS ENCOUNTER (OUTPATIENT)
Dept: FAMILY MEDICINE CLINIC | Facility: CLINIC | Age: 55
End: 2020-07-23

## 2020-07-23 ENCOUNTER — OFFICE VISIT (OUTPATIENT)
Dept: FAMILY MEDICINE CLINIC | Facility: CLINIC | Age: 55
End: 2020-07-23

## 2020-07-23 VITALS
DIASTOLIC BLOOD PRESSURE: 80 MMHG | BODY MASS INDEX: 36.22 KG/M2 | TEMPERATURE: 98 F | HEIGHT: 68 IN | SYSTOLIC BLOOD PRESSURE: 124 MMHG | WEIGHT: 239 LBS | HEART RATE: 70 BPM | OXYGEN SATURATION: 98 %

## 2020-07-23 DIAGNOSIS — M50.30 DEGENERATIVE DISC DISEASE, CERVICAL: ICD-10-CM

## 2020-07-23 DIAGNOSIS — E11.9 TYPE 2 DIABETES MELLITUS WITHOUT COMPLICATION, WITHOUT LONG-TERM CURRENT USE OF INSULIN (HCC): ICD-10-CM

## 2020-07-23 DIAGNOSIS — M54.2 NECK PAIN: ICD-10-CM

## 2020-07-23 DIAGNOSIS — F40.240 CLAUSTROPHOBIA: ICD-10-CM

## 2020-07-23 DIAGNOSIS — E55.9 VITAMIN D DEFICIENCY: ICD-10-CM

## 2020-07-23 DIAGNOSIS — M54.16 CHRONIC RADICULAR LUMBAR PAIN: ICD-10-CM

## 2020-07-23 DIAGNOSIS — M51.34 DEGENERATIVE DISC DISEASE, THORACIC: ICD-10-CM

## 2020-07-23 DIAGNOSIS — M51.36 DEGENERATIVE DISC DISEASE, LUMBAR: ICD-10-CM

## 2020-07-23 DIAGNOSIS — G89.29 CHRONIC RADICULAR LUMBAR PAIN: ICD-10-CM

## 2020-07-23 DIAGNOSIS — Z12.11 COLON CANCER SCREENING: ICD-10-CM

## 2020-07-23 DIAGNOSIS — K21.9 GASTROESOPHAGEAL REFLUX DISEASE WITHOUT ESOPHAGITIS: ICD-10-CM

## 2020-07-23 DIAGNOSIS — I10 ESSENTIAL HYPERTENSION: Primary | ICD-10-CM

## 2020-07-23 DIAGNOSIS — M51.24 HERNIATED NUCLEUS PULPOSUS, THORACIC: ICD-10-CM

## 2020-07-23 DIAGNOSIS — E04.1 THYROID NODULE: ICD-10-CM

## 2020-07-23 DIAGNOSIS — E78.5 HYPERLIPIDEMIA, UNSPECIFIED HYPERLIPIDEMIA TYPE: ICD-10-CM

## 2020-07-23 PROCEDURE — 99215 OFFICE O/P EST HI 40 MIN: CPT | Performed by: FAMILY MEDICINE

## 2020-07-23 RX ORDER — VALSARTAN AND HYDROCHLOROTHIAZIDE 320; 25 MG/1; MG/1
1 TABLET, FILM COATED ORAL DAILY
Qty: 90 TABLET | Refills: 3 | Status: SHIPPED | OUTPATIENT
Start: 2020-07-23 | End: 2021-09-27

## 2020-07-23 RX ORDER — ATORVASTATIN CALCIUM 20 MG/1
20 TABLET, FILM COATED ORAL DAILY
Qty: 30 TABLET | Refills: 11 | Status: SHIPPED | OUTPATIENT
Start: 2020-07-23 | End: 2021-09-27

## 2020-07-23 RX ORDER — AMLODIPINE BESYLATE 5 MG/1
5 TABLET ORAL 2 TIMES DAILY
Qty: 60 TABLET | Refills: 11 | Status: SHIPPED | OUTPATIENT
Start: 2020-07-23 | End: 2021-09-27

## 2020-07-23 RX ORDER — EMPAGLIFLOZIN 25 MG/1
1 TABLET, FILM COATED ORAL DAILY
Qty: 30 TABLET | Refills: 11 | Status: SHIPPED | OUTPATIENT
Start: 2020-07-23 | End: 2021-06-08

## 2020-07-23 RX ORDER — OMEPRAZOLE 20 MG/1
20 CAPSULE, DELAYED RELEASE ORAL DAILY
Qty: 30 CAPSULE | Refills: 11 | Status: SHIPPED | OUTPATIENT
Start: 2020-07-23

## 2020-07-23 RX ORDER — DIAZEPAM 5 MG/1
5 TABLET ORAL 2 TIMES DAILY PRN
Qty: 10 TABLET | Refills: 0 | Status: SHIPPED | OUTPATIENT
Start: 2020-07-23 | End: 2022-02-14 | Stop reason: SDUPTHER

## 2020-07-23 NOTE — PROGRESS NOTES
Answers for HPI/ROS submitted by the patient on 7/21/2020   Back pain  What is the primary reason for your visit?: Back Pain  Chronicity: chronic  Onset: more than 1 year ago  Frequency: constantly  Progression since onset: waxing and waning  Pain location: sacro-iliac, thoracic spine  Pain quality: burning  Radiates to: right foot, right thigh  Pain - numeric: 6/10  Pain is: the same all the time  Aggravated by: standing  Stiffness is present: all day  headaches: Yes  leg pain: Yes  numbness: Yes  weakness: Yes  Risk factors: menopause  Subjective   Skylar Paige is a 55 y.o. female.     Chief Complaint   Patient presents with   • Hypertension   • Diabetes   • Follow-up       Hypertension   This is a chronic problem. The current episode started more than 1 year ago. The problem is unchanged. The problem is controlled. Associated symptoms include neck pain. Pertinent negatives include no blurred vision, chest pain, palpitations or shortness of breath.   Diabetes   She presents for her follow-up diabetic visit. She has type 2 diabetes mellitus. Her disease course has been worsening. Pertinent negatives for hypoglycemia include no dizziness. Associated symptoms include fatigue and weakness. Pertinent negatives for diabetes include no blurred vision and no chest pain. Symptoms are worsening.   Hyperlipidemia   This is a chronic problem. The current episode started more than 1 year ago. The problem is controlled. Recent lipid tests were reviewed and are normal. Pertinent negatives include no chest pain or shortness of breath.   Heartburn   She reports no chest pain or no coughing. stable on meds. Associated symptoms include fatigue.      Chronic lumbar pain with radiculopathy.  Patient is stating that it is been getting worse.  She seen in long past specialist however she is not seeing anybody at this time MRI is unavailable at this time it was done several years ago at the previous office records are not  available    Chronic cervical pain with radiculopathy and neurological symptoms such as dizziness.  It is worsening patient says that sometimes she has to: Sick because of the dizziness and severe neck pain MRI was not done previous x-rays at this time are not available from the previous office.    Chronic thoracic pain.  Patient states that this pain has been worsening nothing is helping previous MRI was reviewed it is actually available from the old office and showing severe degenerative disc disease with disc herniations at multiple levels    Multiple medical complaints and conditions to be addressed today.      The following portions of the patient's history were reviewed and updated as appropriate: allergies, current medications, past family history, past medical history, past social history, past surgical history and problem list.    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)    • Pre-syncope    • Right thyroid nodule    • Solitary nodule of right lobe of thyroid    • Thoracic back pain    • Visit for routine gyn exam    • Vitamin D deficiency        Past Surgical History:   Procedure Laterality Date   • CHOLECYSTECTOMY     • CHOLECYSTECTOMY         Family History   Problem Relation Age of Onset   • Cancer Mother    • Heart disease Mother    • Diabetes Paternal Grandmother    • Heart disease Maternal Grandmother    • Diabetes Paternal Grandfather        Social  History     Socioeconomic History   • Marital status:      Spouse name: Not on file   • Number of children: Not on file   • Years of education: Not on file   • Highest education level: Not on file   Occupational History   • Occupation:    Tobacco Use   • Smoking status: Former Smoker     Packs/day: 0.50     Start date:      Last attempt to quit:      Years since quittin.5   • Smokeless tobacco: Never Used   Substance and Sexual Activity   • Alcohol use: No   • Drug use: No       Current Outpatient Medications on File Prior to Visit   Medication Sig Dispense Refill   • metFORMIN (GLUCOPHAGE) 500 MG tablet Take 1 tablet by mouth 2 (Two) Times a Day With Meals. 180 tablet 3   • [DISCONTINUED] amLODIPine (NORVASC) 5 MG tablet Take 5 mg by mouth Daily.     • [DISCONTINUED] atorvastatin (LIPITOR) 20 MG tablet Take 1 tablet by mouth Daily. 30 tablet 11   • [DISCONTINUED] JARDIANCE 25 MG tablet TAKE ONE TABLET BY MOUTH DAILY 30 tablet 2   • [DISCONTINUED] omeprazole (PRILOSEC) 20 MG capsule Take 1 capsule by mouth Daily. 30 capsule 11   • [DISCONTINUED] valsartan-hydrochlorothiazide (DIOVAN-HCT) 320-25 MG per tablet Take 1 tablet by mouth Daily. Take 1/2 tablet in the morning and 1/2 tablet in the evening. 90 tablet 3     No current facility-administered medications on file prior to visit.        Review of Systems   Constitutional: Positive for fatigue.   HENT: Negative.    Eyes: Negative for blurred vision.   Respiratory: Negative for cough, chest tightness and shortness of breath.    Cardiovascular: Negative for chest pain, palpitations and leg swelling.   Gastrointestinal: Positive for GERD.   Genitourinary: Negative.    Musculoskeletal: Positive for back pain, neck pain and neck stiffness.   Allergic/Immunologic: Negative.    Neurological: Positive for weakness, numbness and headache. Negative for dizziness and light-headedness.   Hematological: Negative.    Psychiatric/Behavioral:  "Negative.        No results found for this or any previous visit (from the past 4704 hour(s)).  Objective   Vitals:    07/23/20 1125   BP: 124/80   Pulse: 70   Temp: 98 °F (36.7 °C)   SpO2: 98%   Weight: 108 kg (239 lb)   Height: 172.7 cm (67.99\")     Body mass index is 36.35 kg/m².  Physical Exam   Constitutional: She appears well-developed and well-nourished. No distress.   Cardiovascular: Normal rate and regular rhythm.   Pulmonary/Chest: Effort normal and breath sounds normal. No respiratory distress. She has no wheezes.   Skin: She is not diaphoretic.   Nursing note and vitals reviewed.        Assessment/Plan   Skylar was seen today for hypertension, diabetes and follow-up.    Diagnoses and all orders for this visit:    Essential hypertension  -     Comprehensive Metabolic Panel  -     Lipid Panel With LDL / HDL Ratio  -     valsartan-hydrochlorothiazide (DIOVAN-HCT) 320-25 MG per tablet; Take 1 tablet by mouth Daily. Take 1/2 tablet in the morning and 1/2 tablet in the evening.  -     amLODIPine (NORVASC) 5 MG tablet; Take 1 tablet by mouth 2 (two) times a day.    Hyperlipidemia, unspecified hyperlipidemia type  -     Comprehensive Metabolic Panel  -     Lipid Panel With LDL / HDL Ratio  -     atorvastatin (LIPITOR) 20 MG tablet; Take 1 tablet by mouth Daily.    Type 2 diabetes mellitus without complication, without long-term current use of insulin (CMS/[facility])  -     Ambulatory Referral to Podiatry  -     Hemoglobin A1c  -     Comprehensive Metabolic Panel  -     Lipid Panel With LDL / HDL Ratio  -     JARDIANCE 25 MG tablet; Take 25 mg by mouth Daily.    Gastroesophageal reflux disease without esophagitis  -     omeprazole (PrilOSEC) 20 MG capsule; Take 1 capsule by mouth Daily.    Thyroid nodule  -     TSH    Vitamin D deficiency  -     Vitamin D 25 Hydroxy    Colon cancer screening  -     Cologuard - Stool, Per Rectum; Future    Chronic radicular lumbar pain  -     MRI Lumbar Spine Without Contrast; " Future    Neck pain  -     MRI Cervical Spine Without Contrast; Future    Degenerative disc disease, cervical  -     MRI Cervical Spine Without Contrast; Future    Degenerative disc disease, lumbar  -     MRI Lumbar Spine Without Contrast; Future    Degenerative disc disease, thoracic    Herniated nucleus pulposus, thoracic  -     MRI Thoracic Spine Without Contrast; Future    Claustrophobia  -     diazePAM (Valium) 5 MG tablet; Take 1 tablet by mouth 2 (Two) Times a Day As Needed for Anxiety.    Return in about 3 months (around 10/23/2020) for DIABETES.    Time spent with the patient today over 45 minutes.

## 2020-07-24 LAB
25(OH)D3+25(OH)D2 SERPL-MCNC: 30.6 NG/ML (ref 30–100)
ALBUMIN SERPL-MCNC: 4.5 G/DL (ref 3.8–4.9)
ALBUMIN/GLOB SERPL: 1.6 {RATIO} (ref 1.2–2.2)
ALP SERPL-CCNC: 46 IU/L (ref 39–117)
ALT SERPL-CCNC: 42 IU/L (ref 0–32)
AST SERPL-CCNC: 26 IU/L (ref 0–40)
BILIRUB SERPL-MCNC: 0.4 MG/DL (ref 0–1.2)
BUN SERPL-MCNC: 13 MG/DL (ref 6–24)
BUN/CREAT SERPL: 19 (ref 9–23)
CALCIUM SERPL-MCNC: 9.6 MG/DL (ref 8.7–10.2)
CHLORIDE SERPL-SCNC: 98 MMOL/L (ref 96–106)
CHOLEST SERPL-MCNC: 203 MG/DL (ref 100–199)
CO2 SERPL-SCNC: 26 MMOL/L (ref 20–29)
CREAT SERPL-MCNC: 0.68 MG/DL (ref 0.57–1)
GLOBULIN SER CALC-MCNC: 2.8 G/DL (ref 1.5–4.5)
GLUCOSE SERPL-MCNC: 137 MG/DL (ref 65–99)
HBA1C MFR BLD: 7.6 % (ref 4.8–5.6)
HDLC SERPL-MCNC: 43 MG/DL
LDLC SERPL CALC-MCNC: 139 MG/DL (ref 0–99)
LDLC/HDLC SERPL: 3.2 RATIO (ref 0–3.2)
POTASSIUM SERPL-SCNC: 4.1 MMOL/L (ref 3.5–5.2)
PROT SERPL-MCNC: 7.3 G/DL (ref 6–8.5)
SODIUM SERPL-SCNC: 138 MMOL/L (ref 134–144)
TRIGL SERPL-MCNC: 107 MG/DL (ref 0–149)
TSH SERPL DL<=0.005 MIU/L-ACNC: 0.64 UIU/ML (ref 0.45–4.5)
VLDLC SERPL CALC-MCNC: 21 MG/DL (ref 5–40)

## 2020-08-10 DIAGNOSIS — M51.34 DEGENERATIVE DISC DISEASE, THORACIC: ICD-10-CM

## 2020-08-10 DIAGNOSIS — M50.30 DISC-OSTEOPHYTE COMPLEX: ICD-10-CM

## 2020-08-10 DIAGNOSIS — M25.78 DISC-OSTEOPHYTE COMPLEX: ICD-10-CM

## 2020-08-10 DIAGNOSIS — M51.36 DEGENERATIVE DISC DISEASE, LUMBAR: ICD-10-CM

## 2020-08-10 DIAGNOSIS — M50.30 DEGENERATIVE DISC DISEASE, CERVICAL: ICD-10-CM

## 2020-08-10 DIAGNOSIS — M50.30 DEGENERATIVE DISC DISEASE, CERVICAL: Primary | ICD-10-CM

## 2020-08-10 DIAGNOSIS — M54.16 CHRONIC RADICULAR LUMBAR PAIN: ICD-10-CM

## 2020-08-10 DIAGNOSIS — G89.29 CHRONIC RADICULAR LUMBAR PAIN: ICD-10-CM

## 2020-08-10 DIAGNOSIS — M54.2 NECK PAIN: ICD-10-CM

## 2020-11-25 NOTE — PROGRESS NOTES
Subjective   History of Present Illness: Skylar Paige is a 55 y.o. female is being seen for consultation today at the request of Skylar Freitas MD for lumbar radiculopathy, cervical radiculopathy, and chronic thoracic pain.     Patient hd MRI cervical spine, lumbar spine at [facility] 08.06.2020.    Patient reports today that she has low back pain.She reports that her low back radiates down her legs intermittently.     Patient reports that she does have neck pain as well. Patient states that she does have HA's intermittent. Patient reports when she does have the HA's, her head is painful to the touch.    She reports that she does have intermittent numbness in her R arm.    This very nice lady is a  and states she has done physical work all of her adult life.  About 2 years ago she began developing some thoracic back pain and in fact was seen by our nurse practitioner last year.  Physical therapy was recommended but it did not help her.  In fact she said it made her worse.  She has also developed some neck pain and some low back pain.  In the beginning when she had the low back pain she had some right buttock and leg pain but it has gotten better and now it is mainly the back.  Likewise the neck was not associated with any arm pain and while it is not as bad as the thoracic spine has persisted.  She has worked with Dr. Ortiz in the past and had some lumbar epidural blocks and 1 thoracic block.  She seems to recall it was an epidural block without much benefit.  She has some tenderness to palpation in the thoracic spine and extension tends to make her symptoms worse but she has no motor or sensory deficits.  We went over her MRI results.  She does have degenerative reasons for her pain particularly in the thoracic spine but there is simply nothing surgical.  She does have some root compression on the right at L5-S1 and if she develops intractable radiculopathy on the right that does not respond to  conservative treatment one could consider a surgery at L5-S1 but that is not the case right now.  It is mainly the thoracic spine.  I think she would benefit given the failure of physical therapy from a thoracic radiofrequency ablation.  We will send her to Dr. Mcintosh for this.  We will keep it open-ended since I have not much else to offer.  If she develops severe recurrent right radiating buttock and leg pain, she can certainly be re-evaluated again.          Back Pain  This is a recurrent problem. The problem occurs constantly. The problem is unchanged. The pain is present in the lumbar spine. The quality of the pain is described as stabbing and shooting. The pain radiates to the left knee and right knee. The pain is at a severity of 5/10. The pain is moderate. The pain is worse during the day. The symptoms are aggravated by standing and bending. Associated symptoms include headaches, leg pain, numbness, tingling and weakness. Pertinent negatives include no bladder incontinence, bowel incontinence, dysuria or fever. The treatment provided mild relief.   Neck Pain   The problem occurs constantly. The quality of the pain is described as cramping, shooting and aching. The pain is at a severity of 8/10. The pain is moderate. Nothing aggravates the symptoms. The pain is same all the time. Stiffness is present all day. Associated symptoms include headaches, leg pain, numbness, tingling, a visual change and weakness. Pertinent negatives include no fever or trouble swallowing. The treatment provided no relief.       The following portions of the patient's history were reviewed and updated as appropriate: allergies, current medications, past family history, past medical history, past social history, past surgical history and problem list.    Review of Systems   Constitutional: Negative for chills and fever.   HENT: Negative for congestion and trouble swallowing.    Gastrointestinal: Negative for bowel incontinence.  "  Genitourinary: Negative for bladder incontinence and dysuria.   Musculoskeletal: Positive for back pain, neck pain and neck stiffness. Negative for gait problem.   Neurological: Positive for tingling, weakness, numbness and headaches.   All other systems reviewed and are negative.          Objective     Vitals:    11/30/20 0931   BP: 165/95   Cuff Size: Adult   Pulse: 95   Temp: 98.6 °F (37 °C)   Weight: 109 kg (241 lb)   Height: 172.7 cm (67.99\")     Body mass index is 36.65 kg/m².      Physical Exam  Constitutional:       Appearance: She is well-developed.   HENT:      Head: Normocephalic and atraumatic.   Eyes:      Extraocular Movements: EOM normal.      Conjunctiva/sclera: Conjunctivae normal.      Pupils: Pupils are equal, round, and reactive to light.      Funduscopic exam:     Right eye: No papilledema.         Left eye: No papilledema.   Neck:      Vascular: No carotid bruit.   Neurological:      Mental Status: She is oriented to person, place, and time.      Coordination: Finger-Nose-Finger Test and Heel to Shin Test normal.      Gait: Gait is intact.      Deep Tendon Reflexes:      Reflex Scores:       Tricep reflexes are 2+ on the right side and 2+ on the left side.       Bicep reflexes are 2+ on the right side and 2+ on the left side.       Brachioradialis reflexes are 2+ on the right side and 2+ on the left side.       Patellar reflexes are 2+ on the right side and 2+ on the left side.       Achilles reflexes are 2+ on the right side and 2+ on the left side.  Psychiatric:         Speech: Speech normal.       Neurologic Exam     Mental Status   Oriented to person, place, and time.   Registration of memory: Good recent and remote memory.   Attention: normal. Concentration: normal.   Speech: speech is normal   Level of consciousness: alert  Knowledge: consistent with education.     Cranial Nerves     CN II   Visual fields full to confrontation.   Visual acuity: normal    CN III, IV, VI   Pupils are " equal, round, and reactive to light.  Extraocular motions are normal.     CN V   Facial sensation intact.   Right corneal reflex: normal  Left corneal reflex: normal    CN VII   Facial expression full, symmetric.   Right facial weakness: none  Left facial weakness: none    CN VIII   Hearing: intact    CN IX, X   Palate: symmetric    CN XI   Right sternocleidomastoid strength: normal  Left sternocleidomastoid strength: normal    CN XII   Tongue: not atrophic  Tongue deviation: none    Motor Exam   Muscle bulk: normal  Right arm tone: normal  Left arm tone: normal  Right leg tone: normal  Left leg tone: normal    Strength   Strength 5/5 except as noted.     Sensory Exam   Light touch normal.     Gait, Coordination, and Reflexes     Gait  Gait: normal    Coordination   Finger to nose coordination: normal  Heel to shin coordination: normal    Reflexes   Right brachioradialis: 2+  Left brachioradialis: 2+  Right biceps: 2+  Left biceps: 2+  Right triceps: 2+  Left triceps: 2+  Right patellar: 2+  Left patellar: 2+  Right achilles: 2+  Left achilles: 2+  Right : 2+  Left : 2+          Assessment/Plan   Independent Review of Radiographic Studies:      I personally reviewed the images from the following studies.    I reviewed the thoracic MRI done in January 2019 which showed multilevel disc degeneration and facet disease but no significant cord compression.  The cervical MRI done on 8/6/2020 also showed some disc osteophyte complexes at C5-C6 and C6-C7.  Lumbar MRI showed some mild stenosis at L4-L5 and some right-sided paracentral disc protrusion with lateral recess stenosis at L5-S1.  Agree with the report.        Medical Decision Making:      Again, nothing surgical at this time.  She will be sent to Dr. Mcintosh to deal with the most painful of her symptoms that being the thoracic pain.  She might benefit from a thoracic radiofrequency ablation.  We will keep it open-ended since there is nothing more I have to  offer right now.  If she develops worsening of her right buttock and leg pain that is intractable to conservative treatment such as further epidural blocks, she can come back to see me.      Diagnoses and all orders for this visit:    1. Chronic midline thoracic back pain (Primary)  -     Ambulatory Referral to Pain Management    2. DDD (degenerative disc disease), lumbar    3. Degeneration of intervertebral disc at C5-C6 level      No follow-ups on file.

## 2020-11-30 ENCOUNTER — OFFICE VISIT (OUTPATIENT)
Dept: NEUROSURGERY | Facility: CLINIC | Age: 55
End: 2020-11-30

## 2020-11-30 VITALS
BODY MASS INDEX: 36.53 KG/M2 | TEMPERATURE: 98.6 F | WEIGHT: 241 LBS | HEIGHT: 68 IN | SYSTOLIC BLOOD PRESSURE: 165 MMHG | HEART RATE: 95 BPM | DIASTOLIC BLOOD PRESSURE: 95 MMHG

## 2020-11-30 DIAGNOSIS — M51.36 DDD (DEGENERATIVE DISC DISEASE), LUMBAR: ICD-10-CM

## 2020-11-30 DIAGNOSIS — M50.322 DEGENERATION OF INTERVERTEBRAL DISC AT C5-C6 LEVEL: ICD-10-CM

## 2020-11-30 DIAGNOSIS — G89.29 CHRONIC MIDLINE THORACIC BACK PAIN: Primary | ICD-10-CM

## 2020-11-30 DIAGNOSIS — M54.6 CHRONIC MIDLINE THORACIC BACK PAIN: Primary | ICD-10-CM

## 2020-11-30 PROBLEM — M51.369 DDD (DEGENERATIVE DISC DISEASE), LUMBAR: Status: ACTIVE | Noted: 2020-11-30

## 2020-11-30 PROCEDURE — 99204 OFFICE O/P NEW MOD 45 MIN: CPT | Performed by: NEUROLOGICAL SURGERY

## 2021-02-26 ENCOUNTER — OFFICE VISIT (OUTPATIENT)
Dept: FAMILY MEDICINE CLINIC | Facility: CLINIC | Age: 56
End: 2021-02-26

## 2021-02-26 VITALS
HEIGHT: 68 IN | DIASTOLIC BLOOD PRESSURE: 84 MMHG | HEART RATE: 70 BPM | WEIGHT: 234 LBS | SYSTOLIC BLOOD PRESSURE: 136 MMHG | OXYGEN SATURATION: 97 % | TEMPERATURE: 97 F | BODY MASS INDEX: 35.46 KG/M2

## 2021-02-26 DIAGNOSIS — H61.23 BILATERAL IMPACTED CERUMEN: ICD-10-CM

## 2021-02-26 DIAGNOSIS — E11.9 TYPE 2 DIABETES MELLITUS WITHOUT COMPLICATION, WITHOUT LONG-TERM CURRENT USE OF INSULIN (HCC): Primary | ICD-10-CM

## 2021-02-26 DIAGNOSIS — I10 ESSENTIAL HYPERTENSION: ICD-10-CM

## 2021-02-26 DIAGNOSIS — E04.1 THYROID NODULE: ICD-10-CM

## 2021-02-26 DIAGNOSIS — E78.2 MIXED HYPERLIPIDEMIA: ICD-10-CM

## 2021-02-26 DIAGNOSIS — E55.9 VITAMIN D DEFICIENCY: ICD-10-CM

## 2021-02-26 PROCEDURE — 69209 REMOVE IMPACTED EAR WAX UNI: CPT | Performed by: FAMILY MEDICINE

## 2021-02-26 PROCEDURE — 99214 OFFICE O/P EST MOD 30 MIN: CPT | Performed by: FAMILY MEDICINE

## 2021-02-26 RX ORDER — CLONIDINE HYDROCHLORIDE 0.1 MG/1
0.1 TABLET ORAL DAILY
Qty: 30 TABLET | Refills: 11 | Status: SHIPPED | OUTPATIENT
Start: 2021-02-26 | End: 2022-02-14 | Stop reason: SDUPTHER

## 2021-02-26 NOTE — PROGRESS NOTES
"Answers for HPI/ROS submitted by the patient on 2/20/2021   What is the primary reason for your visit?: High Blood Pressure  Answers for HPI/ROS submitted by the patient on 2/20/2021   What is the primary reason for your visit?: High Blood Pressure  Subjective   Skylar Paige is a 55 y.o. female.     Chief Complaint   Patient presents with   • Follow-up   • Diabetes   • ears clogged       History of Present Illness   Diabetes follow-up stable.  Hypertension follow-up stable.  Hyperlipidemia follow-up stable.  Complains on \"clogged\" ears    The following portions of the patient's history were reviewed and updated as appropriate: allergies, current medications, past family history, past medical history, past social history, past surgical history and problem list.    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)    • Pre-syncope    • Right thyroid nodule    • Solitary nodule of right lobe of thyroid    • Thoracic back pain    • Visit for routine gyn exam    • Vitamin D deficiency        Past Surgical History:   Procedure Laterality Date   • CHOLECYSTECTOMY     • CHOLECYSTECTOMY         Family History   Problem Relation Age of Onset   • Cancer Mother    • Heart disease Mother    • Diabetes Paternal Grandmother    • Heart disease Maternal Grandmother    • Diabetes Paternal Grandfather        Social History     Socioeconomic History   • Marital status:      Spouse name: " "Not on file   • Number of children: Not on file   • Years of education: Not on file   • Highest education level: Not on file   Occupational History   • Occupation:    Tobacco Use   • Smoking status: Former Smoker     Packs/day: 0.50     Start date:      Quit date:      Years since quittin.1   • Smokeless tobacco: Never Used   Substance and Sexual Activity   • Alcohol use: No   • Drug use: No       Current Outpatient Medications on File Prior to Visit   Medication Sig Dispense Refill   • amLODIPine (NORVASC) 5 MG tablet Take 1 tablet by mouth 2 (two) times a day. 60 tablet 11   • atorvastatin (LIPITOR) 20 MG tablet Take 1 tablet by mouth Daily. 30 tablet 11   • diazePAM (Valium) 5 MG tablet Take 1 tablet by mouth 2 (Two) Times a Day As Needed for Anxiety. 10 tablet 0   • diclofenac sodium (VOTAREN XR) 100 MG 24 hr tablet      • JARDIANCE 25 MG tablet Take 25 mg by mouth Daily. 30 tablet 11   • omeprazole (PrilOSEC) 20 MG capsule Take 1 capsule by mouth Daily. 30 capsule 11   • valsartan-hydrochlorothiazide (DIOVAN-HCT) 320-25 MG per tablet Take 1 tablet by mouth Daily. Take 1/2 tablet in the morning and 1/2 tablet in the evening. 90 tablet 3   • [DISCONTINUED] metFORMIN (GLUCOPHAGE) 500 MG tablet Take 1 tablet by mouth 2 (Two) Times a Day With Meals. 180 tablet 3     No current facility-administered medications on file prior to visit.        Review of Systems   Musculoskeletal: Positive for arthralgias and back pain.       No results found for this or any previous visit (from the past 4704 hour(s)).  Objective   Vitals:    21 0827   BP: 136/84   Pulse: 70   Temp: 97 °F (36.1 °C)   SpO2: 97%   Weight: 106 kg (234 lb)   Height: 172.7 cm (67.99\")     Body mass index is 35.59 kg/m².  Physical Exam  Vitals signs and nursing note reviewed.   Constitutional:       General: She is not in acute distress.     Appearance: She is well-developed. She is not diaphoretic.   HENT:      Head:      " Comments: Bilateral cerumen impaction  Cardiovascular:      Rate and Rhythm: Normal rate and regular rhythm.   Pulmonary:      Effort: Pulmonary effort is normal. No respiratory distress.      Breath sounds: Normal breath sounds. No wheezing.             Diagnoses and all orders for this visit:    1. Type 2 diabetes mellitus without complication, without long-term current use of insulin (CMS/[facility]) (Primary)  Comments:  Stable continue current medications.  Labs today  Orders:  -     Hemoglobin A1c  -     Comprehensive Metabolic Panel  -     Lipid Panel With LDL / HDL Ratio  -     CBC & Differential  -     metFORMIN (GLUCOPHAGE) 500 MG tablet; Take 1 tablet by mouth 2 (Two) Times a Day With Meals.  Dispense: 180 tablet; Refill: 3    2. Essential hypertension  Comments:  Stable.  Continue current medications.  Labs today.  Patient wants to have clonidine refilled for emergencies  Orders:  -     Comprehensive Metabolic Panel  -     Lipid Panel With LDL / HDL Ratio  -     CBC & Differential  -     cloNIDine (Catapres) 0.1 MG tablet; Take 1 tablet by mouth Daily.  Dispense: 30 tablet; Refill: 11    3. Mixed hyperlipidemia  Comments:  Stable.  Labs today  Orders:  -     Comprehensive Metabolic Panel  -     Lipid Panel With LDL / HDL Ratio    4. Thyroid nodule  -     Vitamin D 25 Hydroxy    5. Vitamin D deficiency  -     TSH    6. Bilateral impacted cerumen  Comments:  New diagnosis.    Orders:  -     Ear Cerumen Removal          Ear Cerumen Removal    Date/Time: 2/26/2021 9:01 AM  Performed by: Skylar Freitas MD  Authorized by: Skylar Freitas MD   Location details: left ear and right ear  Comments: Bilateral ears were irrigated with warm hydroperoxide solution.  Canals are clear  Procedure type: irrigation

## 2021-02-27 LAB
25(OH)D3+25(OH)D2 SERPL-MCNC: 36.3 NG/ML (ref 30–100)
ALBUMIN SERPL-MCNC: 5.1 G/DL (ref 3.5–5.2)
ALBUMIN/GLOB SERPL: 1.6 G/DL
ALP SERPL-CCNC: 52 U/L (ref 39–117)
ALT SERPL-CCNC: 54 U/L (ref 1–33)
AST SERPL-CCNC: 32 U/L (ref 1–32)
BASOPHILS # BLD AUTO: 0.04 10*3/MM3 (ref 0–0.2)
BASOPHILS NFR BLD AUTO: 0.6 % (ref 0–1.5)
BILIRUB SERPL-MCNC: 0.6 MG/DL (ref 0–1.2)
BUN SERPL-MCNC: 19 MG/DL (ref 6–20)
BUN/CREAT SERPL: 24.4 (ref 7–25)
CALCIUM SERPL-MCNC: 10.6 MG/DL (ref 8.6–10.5)
CHLORIDE SERPL-SCNC: 97 MMOL/L (ref 98–107)
CHOLEST SERPL-MCNC: 220 MG/DL (ref 0–200)
CO2 SERPL-SCNC: 27.9 MMOL/L (ref 22–29)
CREAT SERPL-MCNC: 0.78 MG/DL (ref 0.57–1)
EOSINOPHIL # BLD AUTO: 0.08 10*3/MM3 (ref 0–0.4)
EOSINOPHIL NFR BLD AUTO: 1.2 % (ref 0.3–6.2)
ERYTHROCYTE [DISTWIDTH] IN BLOOD BY AUTOMATED COUNT: 12.5 % (ref 12.3–15.4)
GLOBULIN SER CALC-MCNC: 3.1 GM/DL
GLUCOSE SERPL-MCNC: 161 MG/DL (ref 65–99)
HBA1C MFR BLD: 8.7 % (ref 4.8–5.6)
HCT VFR BLD AUTO: 49.1 % (ref 34–46.6)
HDLC SERPL-MCNC: 42 MG/DL (ref 40–60)
HGB BLD-MCNC: 16.4 G/DL (ref 12–15.9)
IMM GRANULOCYTES # BLD AUTO: 0.03 10*3/MM3 (ref 0–0.05)
IMM GRANULOCYTES NFR BLD AUTO: 0.5 % (ref 0–0.5)
LDLC SERPL CALC-MCNC: 146 MG/DL (ref 0–100)
LDLC/HDLC SERPL: 3.4 {RATIO}
LYMPHOCYTES # BLD AUTO: 2.45 10*3/MM3 (ref 0.7–3.1)
LYMPHOCYTES NFR BLD AUTO: 37.4 % (ref 19.6–45.3)
MCH RBC QN AUTO: 30.3 PG (ref 26.6–33)
MCHC RBC AUTO-ENTMCNC: 33.4 G/DL (ref 31.5–35.7)
MCV RBC AUTO: 90.6 FL (ref 79–97)
MONOCYTES # BLD AUTO: 0.51 10*3/MM3 (ref 0.1–0.9)
MONOCYTES NFR BLD AUTO: 7.8 % (ref 5–12)
NEUTROPHILS # BLD AUTO: 3.44 10*3/MM3 (ref 1.7–7)
NEUTROPHILS NFR BLD AUTO: 52.5 % (ref 42.7–76)
NRBC BLD AUTO-RTO: 0 /100 WBC (ref 0–0.2)
PLATELET # BLD AUTO: 199 10*3/MM3 (ref 140–450)
POTASSIUM SERPL-SCNC: 3.9 MMOL/L (ref 3.5–5.2)
PROT SERPL-MCNC: 8.2 G/DL (ref 6–8.5)
RBC # BLD AUTO: 5.42 10*6/MM3 (ref 3.77–5.28)
SODIUM SERPL-SCNC: 137 MMOL/L (ref 136–145)
TRIGL SERPL-MCNC: 177 MG/DL (ref 0–150)
TSH SERPL DL<=0.005 MIU/L-ACNC: 1.53 UIU/ML (ref 0.27–4.2)
VLDLC SERPL CALC-MCNC: 32 MG/DL (ref 5–40)
WBC # BLD AUTO: 6.55 10*3/MM3 (ref 3.4–10.8)

## 2021-03-26 ENCOUNTER — BULK ORDERING (OUTPATIENT)
Dept: CASE MANAGEMENT | Facility: OTHER | Age: 56
End: 2021-03-26

## 2021-03-26 DIAGNOSIS — Z23 IMMUNIZATION DUE: ICD-10-CM

## 2021-04-02 ENCOUNTER — TELEPHONE (OUTPATIENT)
Dept: FAMILY MEDICINE CLINIC | Facility: CLINIC | Age: 56
End: 2021-04-02

## 2021-04-02 NOTE — TELEPHONE ENCOUNTER
Caller: Skylar Paige    Relationship: Self    Best call back number: [phone]    What form or medical record are you requesting: AUTHORIZATION FOR MEDICATION, JUST LIKE SHE HAD TWO YEARS AGO.     Who is requesting this form or medical record from you: [employer]    How would you like to receive the form or medical records (pick-up, mail, fax): FAX  If fax, what is the fax number: PATIENT DID NOT HAVE NUMBER    Timeframe paperwork needed: ASAP    Additional notes:  SENT IN A NOTE FOR AUTHORIZATION FOR THIS MEDICATION TWO YEARS AGO AND NEEDS IT TO BE SENT AGAIN TO [facility] [employer].     JARDIANCE 25 MG tablet

## 2021-06-08 ENCOUNTER — TELEPHONE (OUTPATIENT)
Dept: FAMILY MEDICINE CLINIC | Facility: CLINIC | Age: 56
End: 2021-06-08

## 2021-06-08 ENCOUNTER — OFFICE VISIT (OUTPATIENT)
Dept: FAMILY MEDICINE CLINIC | Facility: CLINIC | Age: 56
End: 2021-06-08

## 2021-06-08 DIAGNOSIS — E11.9 TYPE 2 DIABETES MELLITUS WITHOUT COMPLICATION, WITHOUT LONG-TERM CURRENT USE OF INSULIN (HCC): ICD-10-CM

## 2021-06-08 DIAGNOSIS — E11.9 TYPE 2 DIABETES MELLITUS WITHOUT COMPLICATION, WITHOUT LONG-TERM CURRENT USE OF INSULIN (HCC): Primary | ICD-10-CM

## 2021-06-08 DIAGNOSIS — Z12.31 VISIT FOR SCREENING MAMMOGRAM: ICD-10-CM

## 2021-06-08 PROCEDURE — 99213 OFFICE O/P EST LOW 20 MIN: CPT | Performed by: FAMILY MEDICINE

## 2021-06-08 RX ORDER — GLIPIZIDE 5 MG/1
5 TABLET, FILM COATED, EXTENDED RELEASE ORAL DAILY
Qty: 90 TABLET | Refills: 3 | Status: SHIPPED | OUTPATIENT
Start: 2021-06-08 | End: 2021-12-14 | Stop reason: SDUPTHER

## 2021-06-08 NOTE — TELEPHONE ENCOUNTER
Caller: Skylar Paige    Relationship: Self    Best call back number: [phone]    What medications are you currently taking:   Current Outpatient Medications on File Prior to Visit   Medication Sig Dispense Refill   • amLODIPine (NORVASC) 5 MG tablet Take 1 tablet by mouth 2 (two) times a day. 60 tablet 11   • atorvastatin (LIPITOR) 20 MG tablet Take 1 tablet by mouth Daily. 30 tablet 11   • cloNIDine (Catapres) 0.1 MG tablet Take 1 tablet by mouth Daily. 30 tablet 11   • diazePAM (Valium) 5 MG tablet Take 1 tablet by mouth 2 (Two) Times a Day As Needed for Anxiety. 10 tablet 0   • diclofenac sodium (VOTAREN XR) 100 MG 24 hr tablet      • JARDIANCE 25 MG tablet Take 25 mg by mouth Daily. 30 tablet 11   • metFORMIN (GLUCOPHAGE) 500 MG tablet Take 1 tablet by mouth 2 (Two) Times a Day With Meals. 180 tablet 3   • omeprazole (PrilOSEC) 20 MG capsule Take 1 capsule by mouth Daily. 30 capsule 11   • valsartan-hydrochlorothiazide (DIOVAN-HCT) 320-25 MG per tablet Take 1 tablet by mouth Daily. Take 1/2 tablet in the morning and 1/2 tablet in the evening. 90 tablet 3     No current facility-administered medications on file prior to visit.        When did you start taking these medications: 2 YEARS     Which medication are you concerned about: JONATHAN    Who prescribed you this medication: MD NIETO     What are your concerns: PATIENT WOULD NOT SPECIFY     How long have you been taking these medications: 2 YEARS     How long have you had these concerns: PATIENT WOULD NOT SPECIFY

## 2021-06-08 NOTE — PROGRESS NOTES
Femi Paige is a 55 y.o. female.   Consent given    Time 20 min    Visit via [facility]    Cc: DIABETES FOLLOW UP    History of Present Illness   DM FOLLOW UP  NEED TO CHANGE JARDIANCE, ITS TOO EXPENSIVE    The following portions of the patient's history were reviewed and updated as appropriate: allergies, current medications, past family history, past medical history, past social history, past surgical history and problem list.    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)    • Pre-syncope    • Right thyroid nodule    • Solitary nodule of right lobe of thyroid    • Thoracic back pain    • Visit for routine gyn exam    • Vitamin D deficiency        Past Surgical History:   Procedure Laterality Date   • CHOLECYSTECTOMY     • CHOLECYSTECTOMY         Family History   Problem Relation Age of Onset   • Cancer Mother    • Heart disease Mother    • Diabetes Paternal Grandmother    • Heart disease Maternal Grandmother    • Diabetes Paternal Grandfather        Social History     Socioeconomic History   • Marital status:      Spouse name: Not on file   • Number of children: Not on file   • Years of education: Not on file   • Highest education level: Not on file   Tobacco Use   • Smoking status: Former Smoker     Packs/day: 0.50     Start date:      Quit date:      Years since quittin.4   • Smokeless tobacco: Never Used   Substance and Sexual  Activity   • Alcohol use: No   • Drug use: No       Current Outpatient Medications on File Prior to Visit   Medication Sig Dispense Refill   • amLODIPine (NORVASC) 5 MG tablet Take 1 tablet by mouth 2 (two) times a day. 60 tablet 11   • atorvastatin (LIPITOR) 20 MG tablet Take 1 tablet by mouth Daily. 30 tablet 11   • cloNIDine (Catapres) 0.1 MG tablet Take 1 tablet by mouth Daily. 30 tablet 11   • diazePAM (Valium) 5 MG tablet Take 1 tablet by mouth 2 (Two) Times a Day As Needed for Anxiety. 10 tablet 0   • diclofenac sodium (VOTAREN XR) 100 MG 24 hr tablet      • omeprazole (PrilOSEC) 20 MG capsule Take 1 capsule by mouth Daily. 30 capsule 11   • valsartan-hydrochlorothiazide (DIOVAN-HCT) 320-25 MG per tablet Take 1 tablet by mouth Daily. Take 1/2 tablet in the morning and 1/2 tablet in the evening. 90 tablet 3   • [DISCONTINUED] JARDIANCE 25 MG tablet Take 25 mg by mouth Daily. 30 tablet 11   • [DISCONTINUED] metFORMIN (GLUCOPHAGE) 500 MG tablet Take 1 tablet by mouth 2 (Two) Times a Day With Meals. 180 tablet 3     No current facility-administered medications on file prior to visit.       Review of Systems   Constitutional: Negative.        Recent Results (from the past 4704 hour(s))   TSH    Collection Time: 02/26/21  9:48 AM    Specimen: Blood   Result Value Ref Range    TSH 1.530 0.270 - 4.200 uIU/mL   Vitamin D 25 Hydroxy    Collection Time: 02/26/21  9:48 AM    Specimen: Blood   Result Value Ref Range    25 Hydroxy, Vitamin D 36.3 30.0 - 100.0 ng/ml   Hemoglobin A1c    Collection Time: 02/26/21  9:48 AM    Specimen: Blood   Result Value Ref Range    Hemoglobin A1C 8.70 (H) 4.80 - 5.60 %   Comprehensive Metabolic Panel    Collection Time: 02/26/21  9:48 AM    Specimen: Blood   Result Value Ref Range    Glucose 161 (H) 65 - 99 mg/dL    BUN 19 6 - 20 mg/dL    Creatinine 0.78 0.57 - 1.00 mg/dL    eGFR Non African Am 77 >60 mL/min/1.73    eGFR African Am 93 >60 mL/min/1.73    BUN/Creatinine Ratio 24.4 7.0 - 25.0     Sodium 137 136 - 145 mmol/L    Potassium 3.9 3.5 - 5.2 mmol/L    Chloride 97 (L) 98 - 107 mmol/L    Total CO2 27.9 22.0 - 29.0 mmol/L    Calcium 10.6 (H) 8.6 - 10.5 mg/dL    Total Protein 8.2 6.0 - 8.5 g/dL    Albumin 5.10 3.50 - 5.20 g/dL    Globulin 3.1 gm/dL    A/G Ratio 1.6 g/dL    Total Bilirubin 0.6 0.0 - 1.2 mg/dL    Alkaline Phosphatase 52 39 - 117 U/L    AST (SGOT) 32 1 - 32 U/L    ALT (SGPT) 54 (H) 1 - 33 U/L   Lipid Panel With LDL / HDL Ratio    Collection Time: 02/26/21  9:48 AM    Specimen: Blood   Result Value Ref Range    Total Cholesterol 220 (H) 0 - 200 mg/dL    Triglycerides 177 (H) 0 - 150 mg/dL    HDL Cholesterol 42 40 - 60 mg/dL    VLDL Cholesterol Bahman 32 5 - 40 mg/dL    LDL Chol Calc (NIH) 146 (H) 0 - 100 mg/dL    LDL/HDL RATIO 3.40    CBC & Differential    Collection Time: 02/26/21  9:48 AM    Specimen: Blood   Result Value Ref Range    WBC 6.55 3.40 - 10.80 10*3/mm3    RBC 5.42 (H) 3.77 - 5.28 10*6/mm3    Hemoglobin 16.4 (H) 12.0 - 15.9 g/dL    Hematocrit 49.1 (H) 34.0 - 46.6 %    MCV 90.6 79.0 - 97.0 fL    MCH 30.3 26.6 - 33.0 pg    MCHC 33.4 31.5 - 35.7 g/dL    RDW 12.5 12.3 - 15.4 %    Platelets 199 140 - 450 10*3/mm3    Neutrophil Rel % 52.5 42.7 - 76.0 %    Lymphocyte Rel % 37.4 19.6 - 45.3 %    Monocyte Rel % 7.8 5.0 - 12.0 %    Eosinophil Rel % 1.2 0.3 - 6.2 %    Basophil Rel % 0.6 0.0 - 1.5 %    Neutrophils Absolute 3.44 1.70 - 7.00 10*3/mm3    Lymphocytes Absolute 2.45 0.70 - 3.10 10*3/mm3    Monocytes Absolute 0.51 0.10 - 0.90 10*3/mm3    Eosinophils Absolute 0.08 0.00 - 0.40 10*3/mm3    Basophils Absolute 0.04 0.00 - 0.20 10*3/mm3    Immature Granulocyte Rel % 0.5 0.0 - 0.5 %    Immature Grans Absolute 0.03 0.00 - 0.05 10*3/mm3    nRBC 0.0 0.0 - 0.2 /100 WBC     Objective   There were no vitals filed for this visit.  There is no height or weight on file to calculate BMI.  Physical Exam  Constitutional:       Appearance: She is obese.   Neurological:      Mental Status: She is  alert.           Diagnoses and all orders for this visit:    1. Type 2 diabetes mellitus without complication, without long-term current use of insulin (CMS/[facility]) (Primary)  -     Hemoglobin A1c  -     Comprehensive Metabolic Panel  -     Lipid Panel With LDL / HDL Ratio  -     Discontinue: SITagliptin (Januvia) 100 MG tablet; Take 1 tablet by mouth Daily.  Dispense: 90 tablet; Refill: 3  -     metFORMIN (GLUCOPHAGE) 1000 MG tablet; Take 1 tablet by mouth 2 (Two) Times a Day With Meals.  Dispense: 180 tablet; Refill: 3  -     glipizide (GLUCOTROL XL) 5 MG ER tablet; Take 1 tablet by mouth Daily.  Dispense: 90 tablet; Refill: 3    2. Type 2 diabetes mellitus without complication, without long-term current use of insulin (CMS/[facility])  Comments:  Stable continue current medications.  Labs today  Orders:  -     Hemoglobin A1c  -     Comprehensive Metabolic Panel  -     Lipid Panel With LDL / HDL Ratio  -     Discontinue: SITagliptin (Januvia) 100 MG tablet; Take 1 tablet by mouth Daily.  Dispense: 90 tablet; Refill: 3  -     metFORMIN (GLUCOPHAGE) 1000 MG tablet; Take 1 tablet by mouth 2 (Two) Times a Day With Meals.  Dispense: 180 tablet; Refill: 3  -     glipizide (GLUCOTROL XL) 5 MG ER tablet; Take 1 tablet by mouth Daily.  Dispense: 90 tablet; Refill: 3    3. Visit for screening mammogram  -     Mammo Screening Digital Tomosynthesis Bilateral With CAD; Future      Return in about 3 months (around 9/8/2021) for DIABETES.      Addendum; pt informed me that Januvia is not covered either on her insurance.  No Insulins are covered.  Will try Glipizide due to cost.

## 2021-06-08 NOTE — TELEPHONE ENCOUNTER
I called patient and patient stated that she would rather do a telephone visit with you to discuss. She states its easier to speak with you in regards to language barrier. Patient ask that you call her sooner and stated it would not take long.

## 2021-06-10 LAB
ALBUMIN SERPL-MCNC: 4.9 G/DL (ref 3.5–5.2)
ALBUMIN/CREAT UR: 7 MG/G CREAT (ref 0–29)
ALBUMIN/GLOB SERPL: 1.8 G/DL
ALP SERPL-CCNC: 61 U/L (ref 39–117)
ALT SERPL-CCNC: 46 U/L (ref 1–33)
APPEARANCE UR: CLEAR
AST SERPL-CCNC: 27 U/L (ref 1–32)
BILIRUB SERPL-MCNC: 0.5 MG/DL (ref 0–1.2)
BILIRUB UR QL STRIP: NEGATIVE
BUN SERPL-MCNC: 18 MG/DL (ref 6–20)
BUN/CREAT SERPL: 24.7 (ref 7–25)
CALCIUM SERPL-MCNC: 10.2 MG/DL (ref 8.6–10.5)
CHLORIDE SERPL-SCNC: 97 MMOL/L (ref 98–107)
CHOLEST SERPL-MCNC: 217 MG/DL (ref 0–200)
CO2 SERPL-SCNC: 26.4 MMOL/L (ref 22–29)
COLOR UR: YELLOW
CREAT SERPL-MCNC: 0.73 MG/DL (ref 0.57–1)
CREAT UR-MCNC: 96.7 MG/DL
GLOBULIN SER CALC-MCNC: 2.7 GM/DL
GLUCOSE SERPL-MCNC: 149 MG/DL (ref 65–99)
GLUCOSE UR QL: ABNORMAL
HBA1C MFR BLD: 7.4 % (ref 4.8–5.6)
HDLC SERPL-MCNC: 43 MG/DL (ref 40–60)
HGB UR QL STRIP: NEGATIVE
KETONES UR QL STRIP: NEGATIVE
LDLC SERPL CALC-MCNC: 146 MG/DL (ref 0–100)
LDLC/HDLC SERPL: 3.33 {RATIO}
LEUKOCYTE ESTERASE UR QL STRIP: NEGATIVE
MICROALBUMIN UR-MCNC: 6.3 UG/ML
NITRITE UR QL STRIP: NEGATIVE
PH UR STRIP: 5.5 [PH] (ref 5–8)
POTASSIUM SERPL-SCNC: 4 MMOL/L (ref 3.5–5.2)
PROT SERPL-MCNC: 7.6 G/DL (ref 6–8.5)
PROT UR QL STRIP: NEGATIVE
SODIUM SERPL-SCNC: 136 MMOL/L (ref 136–145)
SP GR UR: ABNORMAL (ref 1–1.03)
TRIGL SERPL-MCNC: 153 MG/DL (ref 0–150)
UROBILINOGEN UR STRIP-MCNC: ABNORMAL MG/DL
VLDLC SERPL CALC-MCNC: 28 MG/DL (ref 5–40)

## 2021-06-16 ENCOUNTER — HOSPITAL ENCOUNTER (OUTPATIENT)
Dept: MAMMOGRAPHY | Facility: HOSPITAL | Age: 56
Discharge: HOME | End: 2021-06-16
Admitting: FAMILY MEDICINE

## 2021-06-16 DIAGNOSIS — Z12.31 VISIT FOR SCREENING MAMMOGRAM: ICD-10-CM

## 2021-06-16 PROCEDURE — 77067 SCR MAMMO BI INCL CAD: CPT

## 2021-06-16 PROCEDURE — 77063 BREAST TOMOSYNTHESIS BI: CPT

## 2021-09-14 ENCOUNTER — OFFICE VISIT (OUTPATIENT)
Dept: FAMILY MEDICINE CLINIC | Facility: CLINIC | Age: 56
End: 2021-09-14

## 2021-09-14 VITALS
OXYGEN SATURATION: 96 % | WEIGHT: 235 LBS | HEART RATE: 70 BPM | DIASTOLIC BLOOD PRESSURE: 76 MMHG | SYSTOLIC BLOOD PRESSURE: 114 MMHG | HEIGHT: 68 IN | TEMPERATURE: 98.6 F | BODY MASS INDEX: 35.61 KG/M2

## 2021-09-14 DIAGNOSIS — E11.9 TYPE 2 DIABETES MELLITUS WITHOUT COMPLICATION, WITHOUT LONG-TERM CURRENT USE OF INSULIN (HCC): Primary | ICD-10-CM

## 2021-09-14 DIAGNOSIS — I10 ESSENTIAL HYPERTENSION: ICD-10-CM

## 2021-09-14 DIAGNOSIS — U09.9 POST-COVID SYNDROME: ICD-10-CM

## 2021-09-14 DIAGNOSIS — E78.2 MIXED HYPERLIPIDEMIA: ICD-10-CM

## 2021-09-14 PROCEDURE — 99214 OFFICE O/P EST MOD 30 MIN: CPT | Performed by: FAMILY MEDICINE

## 2021-09-14 NOTE — PROGRESS NOTES
Subjective   Skylar Paige is a 56 y.o. female.     Chief Complaint   Patient presents with   • Diabetes       History of Present Illness   Patient is coming today follow-up on post Covid.  Which she was diagnosed 3 weeks ago.  She has recovered somewhat.  But still has some tightness in the chest as well as cough and her taste and smell slowly coming back.  She is still having quite significant fatigue.    DM: improving, last A1c 7.4  HTN: hypotensive after Covid so she is holding her blood pressure medications and restarting them slowly.      The following portions of the patient's history were reviewed and updated as appropriate: allergies, current medications, past family history, past medical history, past social history, past surgical history and problem list.    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)    • Pre-syncope    • Right thyroid nodule    • Solitary nodule of right lobe of thyroid    • Thoracic back pain    • Visit for routine gyn exam    • Vitamin D deficiency        Past Surgical History:   Procedure Laterality Date   • CHOLECYSTECTOMY     • CHOLECYSTECTOMY         Family History   Problem Relation Age of Onset   • Cancer Mother    • Heart disease Mother    • Diabetes Paternal Grandmother    • Heart disease Maternal Grandmother    • Diabetes Paternal Grandfather    • Breast cancer Neg Hx        Social History     Socioeconomic History   • Marital  status:      Spouse name: Not on file   • Number of children: Not on file   • Years of education: Not on file   • Highest education level: Not on file   Tobacco Use   • Smoking status: Former Smoker     Packs/day: 0.50     Start date:      Quit date:      Years since quittin.7   • Smokeless tobacco: Never Used   Substance and Sexual Activity   • Alcohol use: No   • Drug use: No       Current Outpatient Medications on File Prior to Visit   Medication Sig Dispense Refill   • amLODIPine (NORVASC) 5 MG tablet Take 1 tablet by mouth 2 (two) times a day. 60 tablet 11   • atorvastatin (LIPITOR) 20 MG tablet Take 1 tablet by mouth Daily. 30 tablet 11   • cloNIDine (Catapres) 0.1 MG tablet Take 1 tablet by mouth Daily. 30 tablet 11   • diazePAM (Valium) 5 MG tablet Take 1 tablet by mouth 2 (Two) Times a Day As Needed for Anxiety. 10 tablet 0   • diclofenac sodium (VOTAREN XR) 100 MG 24 hr tablet      • glipizide (GLUCOTROL XL) 5 MG ER tablet Take 1 tablet by mouth Daily. 90 tablet 3   • metFORMIN (GLUCOPHAGE) 1000 MG tablet Take 1 tablet by mouth 2 (Two) Times a Day With Meals. 180 tablet 3   • omeprazole (PrilOSEC) 20 MG capsule Take 1 capsule by mouth Daily. 30 capsule 11   • valsartan-hydrochlorothiazide (DIOVAN-HCT) 320-25 MG per tablet Take 1 tablet by mouth Daily. Take 1/2 tablet in the morning and 1/2 tablet in the evening. 90 tablet 3     No current facility-administered medications on file prior to visit.       Review of Systems   Constitutional: Negative for fatigue and unexpected weight loss.   Eyes: Negative for blurred vision.   Respiratory: Negative for cough, chest tightness and shortness of breath.    Cardiovascular: Negative for chest pain, palpitations and leg swelling.   Endocrine: Negative for polydipsia, polyphagia and polyuria.   Skin: Negative for pallor.   Neurological: Positive for weakness. Negative for dizziness, tremors, seizures, speech difficulty, light-headedness, headache  and confusion.   Psychiatric/Behavioral: The patient is not nervous/anxious.        Recent Results (from the past 4704 hour(s))   Hemoglobin A1c    Collection Time: 06/09/21 10:18 AM    Specimen: Blood   Result Value Ref Range    Hemoglobin A1C 7.40 (H) 4.80 - 5.60 %   Comprehensive Metabolic Panel    Collection Time: 06/09/21 10:18 AM    Specimen: Blood   Result Value Ref Range    Glucose 149 (H) 65 - 99 mg/dL    BUN 18 6 - 20 mg/dL    Creatinine 0.73 0.57 - 1.00 mg/dL    eGFR Non African Am 83 >60 mL/min/1.73    eGFR African Am 101 >60 mL/min/1.73    BUN/Creatinine Ratio 24.7 7.0 - 25.0    Sodium 136 136 - 145 mmol/L    Potassium 4.0 3.5 - 5.2 mmol/L    Chloride 97 (L) 98 - 107 mmol/L    Total CO2 26.4 22.0 - 29.0 mmol/L    Calcium 10.2 8.6 - 10.5 mg/dL    Total Protein 7.6 6.0 - 8.5 g/dL    Albumin 4.90 3.50 - 5.20 g/dL    Globulin 2.7 gm/dL    A/G Ratio 1.8 g/dL    Total Bilirubin 0.5 0.0 - 1.2 mg/dL    Alkaline Phosphatase 61 39 - 117 U/L    AST (SGOT) 27 1 - 32 U/L    ALT (SGPT) 46 (H) 1 - 33 U/L   Lipid Panel With LDL / HDL Ratio    Collection Time: 06/09/21 10:18 AM    Specimen: Blood   Result Value Ref Range    Total Cholesterol 217 (H) 0 - 200 mg/dL    Triglycerides 153 (H) 0 - 150 mg/dL    HDL Cholesterol 43 40 - 60 mg/dL    VLDL Cholesterol Bahman 28 5 - 40 mg/dL    LDL Chol Calc (NIH) 146 (H) 0 - 100 mg/dL    LDL/HDL RATIO 3.33    Microalbumin / Creatinine Urine Ratio - Urine, Clean Catch    Collection Time: 06/09/21 10:35 AM    Specimen: Urine, Clean Catch   Result Value Ref Range    Creatinine, Urine 96.7 Not Estab. mg/dL    Microalbumin, Urine 6.3 Not Estab. ug/mL    Microalbumin/Creatinine Ratio 7 0 - 29 mg/g creat   Urinalysis without microscopic (no culture) - Urine, Clean Catch    Collection Time: 06/09/21 12:58 PM    Specimen: Urine, Clean Catch   Result Value Ref Range    Specific Gravity, UA Comment 1.005 - 1.030    pH, UA 5.5 5.0 - 8.0    Color, UA Yellow     Appearance, UA Clear Clear     "Leukocytes, UA Negative Negative    Protein Negative Negative    Glucose, UA See below: (A) Negative    Ketones Negative Negative    Blood, UA Negative Negative    Bilirubin, UA Negative Negative    Urobilinogen, UA Comment     Nitrite, UA Negative Negative     Objective   Vitals:    09/14/21 1053   BP: 114/76   BP Location: Right arm   Patient Position: Sitting   Pulse: 70   Temp: 98.6 °F (37 °C)   SpO2: 96%   Weight: 107 kg (235 lb)   Height: 172.7 cm (67.99\")     Body mass index is 35.74 kg/m².  Physical Exam  Vitals and nursing note reviewed.   Constitutional:       General: She is not in acute distress.     Appearance: She is well-developed. She is not diaphoretic.   Cardiovascular:      Rate and Rhythm: Normal rate and regular rhythm.   Pulmonary:      Effort: Pulmonary effort is normal. No respiratory distress.      Breath sounds: Normal breath sounds. No wheezing.           Diagnoses and all orders for this visit:    1. Type 2 diabetes mellitus without complication, without long-term current use of insulin (CMS/[facility]) (Primary)  -     Hemoglobin A1c  -     Comprehensive Metabolic Panel  -     Lipid Panel With LDL / HDL Ratio    2. Essential hypertension  -     Comprehensive Metabolic Panel  -     Lipid Panel With LDL / HDL Ratio    3. Mixed hyperlipidemia  -     Comprehensive Metabolic Panel  -     Lipid Panel With LDL / HDL Ratio    4. Post-COVID syndrome      DM: improving, last A1c 7.4  HTN: hypotensive after Covid so she is holding her blood pressure medications and restarting them slowly.    Continue all current medications for above medical conditions    Return in about 3 months (around 12/14/2021) for DIABETES, HYPERTENSION.      Answers for HPI/ROS submitted by the patient on 9/7/2021  What is the primary reason for your visit?: Diabetes      "

## 2021-09-15 LAB
ALBUMIN SERPL-MCNC: 4.8 G/DL (ref 3.5–5.2)
ALBUMIN/GLOB SERPL: 1.8 G/DL
ALP SERPL-CCNC: 51 U/L (ref 39–117)
ALT SERPL-CCNC: 44 U/L (ref 1–33)
AST SERPL-CCNC: 23 U/L (ref 1–32)
BILIRUB SERPL-MCNC: 0.5 MG/DL (ref 0–1.2)
BUN SERPL-MCNC: 16 MG/DL (ref 6–20)
BUN/CREAT SERPL: 23.2 (ref 7–25)
CALCIUM SERPL-MCNC: 10.1 MG/DL (ref 8.6–10.5)
CHLORIDE SERPL-SCNC: 102 MMOL/L (ref 98–107)
CHOLEST SERPL-MCNC: 196 MG/DL (ref 0–200)
CO2 SERPL-SCNC: 25.6 MMOL/L (ref 22–29)
CREAT SERPL-MCNC: 0.69 MG/DL (ref 0.57–1)
GLOBULIN SER CALC-MCNC: 2.7 GM/DL
GLUCOSE SERPL-MCNC: 161 MG/DL (ref 65–99)
HBA1C MFR BLD: 7.4 % (ref 4.8–5.6)
HDLC SERPL-MCNC: 36 MG/DL (ref 40–60)
LDLC SERPL CALC-MCNC: 133 MG/DL (ref 0–100)
LDLC/HDLC SERPL: 3.62 {RATIO}
POTASSIUM SERPL-SCNC: 4.5 MMOL/L (ref 3.5–5.2)
PROT SERPL-MCNC: 7.5 G/DL (ref 6–8.5)
SODIUM SERPL-SCNC: 139 MMOL/L (ref 136–145)
TRIGL SERPL-MCNC: 148 MG/DL (ref 0–150)
VLDLC SERPL CALC-MCNC: 27 MG/DL (ref 5–40)

## 2021-09-25 DIAGNOSIS — I10 ESSENTIAL HYPERTENSION: ICD-10-CM

## 2021-09-25 DIAGNOSIS — E78.5 HYPERLIPIDEMIA, UNSPECIFIED HYPERLIPIDEMIA TYPE: ICD-10-CM

## 2021-09-27 RX ORDER — ATORVASTATIN CALCIUM 20 MG/1
TABLET, FILM COATED ORAL
Qty: 30 TABLET | Refills: 11 | Status: SHIPPED | OUTPATIENT
Start: 2021-09-27 | End: 2022-08-18 | Stop reason: SDUPTHER

## 2021-09-27 RX ORDER — AMLODIPINE BESYLATE 5 MG/1
TABLET ORAL
Qty: 60 TABLET | Refills: 11 | Status: SHIPPED | OUTPATIENT
Start: 2021-09-27 | End: 2022-08-18 | Stop reason: SDUPTHER

## 2021-09-27 RX ORDER — VALSARTAN AND HYDROCHLOROTHIAZIDE 320; 25 MG/1; MG/1
TABLET, FILM COATED ORAL
Qty: 30 TABLET | Refills: 6 | Status: SHIPPED | OUTPATIENT
Start: 2021-09-27 | End: 2022-08-18 | Stop reason: SDUPTHER

## 2021-10-01 ENCOUNTER — TELEPHONE (OUTPATIENT)
Dept: FAMILY MEDICINE CLINIC | Facility: CLINIC | Age: 56
End: 2021-10-01

## 2021-10-01 NOTE — TELEPHONE ENCOUNTER
Caller: Skylar Paige    Relationship: Self    Best call back number:[phone] (H)    What is the best time to reach you: ANYTIME    Who are you requesting to speak with (clinical staff, provider,  specific staff member): CLINICAL STAFF    What was the call regarding: PATIENT CALLING IN REGARDS TO NEEDING A PROOF LETTER STATING THAT SHE IS FREE TO TRAVEL TO [place] ON SUNDAY DUE TO PATIENT BEING COVID POSITIVE ON 08/24/21    Do you require a callback: YES

## 2021-12-14 ENCOUNTER — OFFICE VISIT (OUTPATIENT)
Dept: FAMILY MEDICINE CLINIC | Facility: CLINIC | Age: 56
End: 2021-12-14

## 2021-12-14 VITALS
WEIGHT: 239 LBS | BODY MASS INDEX: 36.22 KG/M2 | SYSTOLIC BLOOD PRESSURE: 128 MMHG | HEART RATE: 71 BPM | OXYGEN SATURATION: 98 % | HEIGHT: 68 IN | RESPIRATION RATE: 16 BRPM | TEMPERATURE: 96.6 F | DIASTOLIC BLOOD PRESSURE: 82 MMHG

## 2021-12-14 DIAGNOSIS — L65.9 HAIR LOSS: ICD-10-CM

## 2021-12-14 DIAGNOSIS — R53.82 CHRONIC FATIGUE: ICD-10-CM

## 2021-12-14 DIAGNOSIS — E11.9 TYPE 2 DIABETES MELLITUS WITHOUT COMPLICATION, WITHOUT LONG-TERM CURRENT USE OF INSULIN (HCC): ICD-10-CM

## 2021-12-14 DIAGNOSIS — F41.9 ANXIETY: ICD-10-CM

## 2021-12-14 DIAGNOSIS — Z00.00 HEALTH MAINTENANCE EXAMINATION: Primary | ICD-10-CM

## 2021-12-14 DIAGNOSIS — E04.1 THYROID NODULE: ICD-10-CM

## 2021-12-14 PROCEDURE — 99396 PREV VISIT EST AGE 40-64: CPT | Performed by: FAMILY MEDICINE

## 2021-12-14 RX ORDER — HYDROXYZINE HYDROCHLORIDE 25 MG/1
25 TABLET, FILM COATED ORAL 3 TIMES DAILY PRN
Qty: 30 TABLET | Refills: 11 | Status: SHIPPED | OUTPATIENT
Start: 2021-12-14

## 2021-12-14 RX ORDER — GLIPIZIDE 5 MG/1
5 TABLET, FILM COATED, EXTENDED RELEASE ORAL DAILY
Qty: 90 TABLET | Refills: 3 | Status: SHIPPED | OUTPATIENT
Start: 2021-12-14 | End: 2022-06-01

## 2021-12-14 NOTE — PROGRESS NOTES
"Skylar Paige is here today for an annual physical exam.     Eating a healthy diet. Exercising routinely.   Regular periods. Wears seat belt. Feels safe at home.   Sexual activity:yes  Birth control:n/a  Pregnancy:1  Sexual and gender orientation:h/s    PHQ-2 Depression Screening  Little interest or pleasure in doing things?  1   Feeling down, depressed, or hopeless?  1   PHQ-2 Total Score  2         I have reviewed the patient's medical, family, and social history in detail and updated the computerized patient record.    Screening history:  Colonoscopy - up to date  Mammogram-6/2021 , Pap/pelvic - 2019  Metabolic - obese  Dental exam is up-to-date.  Eye exams up-to-date.    Health Maintenance   Topic Date Due   • DIABETIC FOOT EXAM  Never done   • DIABETIC EYE EXAM  Never done   • COVID-19 Vaccine (1) 12/16/2021 (Originally 7/6/1977)   • Hepatitis B (1 of 3 - Risk 3-dose series) 12/14/2022 (Originally 7/6/1984)   • Pneumococcal Vaccine 0-64 (1 of 2 - PPSV23) 12/14/2022 (Originally 7/6/1971)   • HEMOGLOBIN A1C  03/14/2022   • PAP SMEAR  05/13/2022   • URINE MICROALBUMIN  06/09/2022   • LIPID PANEL  09/14/2022   • ANNUAL PHYSICAL  12/15/2022   • MAMMOGRAM  06/16/2023   • COLORECTAL CANCER SCREENING  10/22/2023   • HEPATITIS C SCREENING  Discontinued   • INFLUENZA VACCINE  Discontinued   • TDAP/TD VACCINES  Discontinued   • ZOSTER VACCINE  Discontinued       Review of Systems   Constitutional: Negative.    Psychiatric/Behavioral: The patient is nervous/anxious.        /82 (BP Location: Left arm, Patient Position: Sitting, Cuff Size: Large Adult)   Pulse 71   Temp 96.6 °F (35.9 °C) (Temporal)   Resp 16   Ht 172.7 cm (67.99\")   Wt 108 kg (239 lb)   SpO2 98%   BMI 36.35 kg/m²      Physical Exam      Physical Exam  Vitals and nursing note reviewed.   Constitutional:       General: She is not in acute distress.     Appearance: She is well-developed. She is obese. She is not diaphoretic.   HENT:      Head: " Normocephalic.      Right Ear: Tympanic membrane normal.      Left Ear: Tympanic membrane normal.      Nose: Nose normal.      Mouth/Throat:      Mouth: Mucous membranes are moist.   Eyes:      Pupils: Pupils are equal, round, and reactive to light.   Cardiovascular:      Rate and Rhythm: Normal rate and regular rhythm.   Pulmonary:      Effort: Pulmonary effort is normal. No respiratory distress.      Breath sounds: Normal breath sounds. No wheezing.   Abdominal:      General: Abdomen is flat.   Musculoskeletal:         General: Normal range of motion.   Skin:     General: Skin is warm.   Neurological:      General: No focal deficit present.      Mental Status: She is alert.   Psychiatric:      Comments: Very stressed out       No visits with results within 2 Week(s) from this visit.   Latest known visit with results is:   Office Visit on 09/14/2021   Component Date Value Ref Range Status   • Hemoglobin A1C 09/14/2021 7.40* 4.80 - 5.60 % Final    Comment: Hemoglobin A1C Ranges:  Increased Risk for Diabetes  5.7% to 6.4%  Diabetes                     >= 6.5%  Diabetic Goal                < 7.0%     • Glucose 09/14/2021 161* 65 - 99 mg/dL Final   • BUN 09/14/2021 16  6 - 20 mg/dL Final   • Creatinine 09/14/2021 0.69  0.57 - 1.00 mg/dL Final   • eGFR Non  Am 09/14/2021 88  >60 mL/min/1.73 Final    Comment: GFR Normal >60  Chronic Kidney Disease <60  Kidney Failure <15     • eGFR  Am 09/14/2021 107  >60 mL/min/1.73 Final   • BUN/Creatinine Ratio 09/14/2021 23.2  7.0 - 25.0 Final   • Sodium 09/14/2021 139  136 - 145 mmol/L Final   • Potassium 09/14/2021 4.5  3.5 - 5.2 mmol/L Final   • Chloride 09/14/2021 102  98 - 107 mmol/L Final   • Total CO2 09/14/2021 25.6  22.0 - 29.0 mmol/L Final   • Calcium 09/14/2021 10.1  8.6 - 10.5 mg/dL Final   • Total Protein 09/14/2021 7.5  6.0 - 8.5 g/dL Final   • Albumin 09/14/2021 4.80  3.50 - 5.20 g/dL Final   • Globulin 09/14/2021 2.7  gm/dL Final   • A/G Ratio 09/14/2021  1.8  g/dL Final   • Total Bilirubin 09/14/2021 0.5  0.0 - 1.2 mg/dL Final   • Alkaline Phosphatase 09/14/2021 51  39 - 117 U/L Final   • AST (SGOT) 09/14/2021 23  1 - 32 U/L Final   • ALT (SGPT) 09/14/2021 44* 1 - 33 U/L Final   • Total Cholesterol 09/14/2021 196  0 - 200 mg/dL Final    Comment: Cholesterol Reference Ranges  (U.S. Department of Health and Human Services ATP III  Classifications)  Desirable          <200 mg/dL  Borderline High    200-239 mg/dL  High Risk          >240 mg/dL  Triglyceride Reference Ranges  (U.S. Department of Health and Human Services ATP III  Classifications)  Normal           <150 mg/dL  Borderline High  150-199 mg/dL  High             200-499 mg/dL  Very High        >500 mg/dL  HDL Reference Ranges  (U.S. Department of Health and Human Services ATP III  Classifcations)  Low     <40 mg/dl (major risk factor for CHD)  High    >60 mg/dl ('negative' risk factor for CHD)  LDL Reference Ranges  (U.S. Department of Health and Human Services ATP III  Classifcations)  Optimal          <100 mg/dL  Near Optimal     100-129 mg/dL  Borderline High  130-159 mg/dL  High             160-189 mg/dL  Very High        >189 mg/dL     • Triglycerides 09/14/2021 148  0 - 150 mg/dL Final   • HDL Cholesterol 09/14/2021 36* 40 - 60 mg/dL Final   • VLDL Cholesterol Bahman 09/14/2021 27  5 - 40 mg/dL Final   • LDL Chol Calc (NIH) 09/14/2021 133* 0 - 100 mg/dL Final   • LDL/HDL RATIO 09/14/2021 3.62   Final         Current Outpatient Medications:   •  amLODIPine (NORVASC) 5 MG tablet, TAKE ONE TABLET BY MOUTH TWICE A DAY, Disp: 60 tablet, Rfl: 11  •  atorvastatin (LIPITOR) 20 MG tablet, TAKE ONE TABLET BY MOUTH DAILY, Disp: 30 tablet, Rfl: 11  •  cloNIDine (Catapres) 0.1 MG tablet, Take 1 tablet by mouth Daily., Disp: 30 tablet, Rfl: 11  •  diazePAM (Valium) 5 MG tablet, Take 1 tablet by mouth 2 (Two) Times a Day As Needed for Anxiety., Disp: 10 tablet, Rfl: 0  •  diclofenac sodium (VOTAREN XR) 100 MG 24 hr  tablet, , Disp: , Rfl:   •  glipizide (GLUCOTROL XL) 5 MG ER tablet, Take 1 tablet by mouth Daily., Disp: 90 tablet, Rfl: 3  •  metFORMIN (GLUCOPHAGE) 1000 MG tablet, Take 1 tablet by mouth 2 (Two) Times a Day With Meals., Disp: 180 tablet, Rfl: 3  •  omeprazole (PrilOSEC) 20 MG capsule, Take 1 capsule by mouth Daily., Disp: 30 capsule, Rfl: 11  •  valsartan-hydrochlorothiazide (DIOVAN-HCT) 320-25 MG per tablet, TAKE  A HALF TABLET BY MOUTH IN THE MORNING AND A HALF TABLET BY MOUTH IN THE EVENING     (DOSE INSTILL TABLET BY MOUTH TABLET DAILY), Disp: 30 tablet, Rfl: 6  •  hydrOXYzine (ATARAX) 25 MG tablet, Take 1 tablet by mouth 3 (Three) Times a Day As Needed for Itching., Disp: 30 tablet, Rfl: 11    Diagnoses and all orders for this visit:    1. Health maintenance examination (Primary)  -     Comprehensive Metabolic Panel  -     Lipid Panel  -     CBC & Differential    2. Type 2 diabetes mellitus without complication, without long-term current use of insulin (HCC)  -     glipizide (GLUCOTROL XL) 5 MG ER tablet; Take 1 tablet by mouth Daily.  Dispense: 90 tablet; Refill: 3    3. Anxiety  -     hydrOXYzine (ATARAX) 25 MG tablet; Take 1 tablet by mouth 3 (Three) Times a Day As Needed for Itching.  Dispense: 30 tablet; Refill: 11    4. Chronic fatigue  -     TSH Rfx On Abnormal To Free T4    5. Hair loss  -     TSH Rfx On Abnormal To Free T4    6. Thyroid nodule  -     US Thyroid; Future        Encourage healthy diet and exercise.  Encourage patient to stay up to date on screening examinations as indicated based on age and risk factors. F/U yearly.     Patient was extensively counseled regarding coronavirus immunization and necessity of it.  However patient refuses at this time and is against coronavirus vaccination as well as other vaccinations.  Risks and benefits of the vaccination were discussed however patient is adamant and refuses all the vaccinations today.

## 2021-12-15 LAB
ALBUMIN SERPL-MCNC: 4.9 G/DL (ref 3.8–4.9)
ALBUMIN/GLOB SERPL: 1.6 {RATIO} (ref 1.2–2.2)
ALP SERPL-CCNC: 44 IU/L (ref 44–121)
ALT SERPL-CCNC: 60 IU/L (ref 0–32)
AST SERPL-CCNC: 33 IU/L (ref 0–40)
BASOPHILS # BLD AUTO: 0 X10E3/UL (ref 0–0.2)
BASOPHILS NFR BLD AUTO: 1 %
BILIRUB SERPL-MCNC: 0.4 MG/DL (ref 0–1.2)
BUN SERPL-MCNC: 16 MG/DL (ref 6–24)
BUN/CREAT SERPL: 22 (ref 9–23)
CALCIUM SERPL-MCNC: 10.1 MG/DL (ref 8.7–10.2)
CHLORIDE SERPL-SCNC: 103 MMOL/L (ref 96–106)
CHOLEST SERPL-MCNC: 209 MG/DL (ref 100–199)
CO2 SERPL-SCNC: 22 MMOL/L (ref 20–29)
CREAT SERPL-MCNC: 0.74 MG/DL (ref 0.57–1)
EOSINOPHIL # BLD AUTO: 0.1 X10E3/UL (ref 0–0.4)
EOSINOPHIL NFR BLD AUTO: 2 %
ERYTHROCYTE [DISTWIDTH] IN BLOOD BY AUTOMATED COUNT: 12.5 % (ref 11.7–15.4)
GLOBULIN SER CALC-MCNC: 3.1 G/DL (ref 1.5–4.5)
GLUCOSE SERPL-MCNC: 159 MG/DL (ref 65–99)
HCT VFR BLD AUTO: 43.8 % (ref 34–46.6)
HDLC SERPL-MCNC: 43 MG/DL
HGB BLD-MCNC: 15 G/DL (ref 11.1–15.9)
IMM GRANULOCYTES # BLD AUTO: 0 X10E3/UL (ref 0–0.1)
IMM GRANULOCYTES NFR BLD AUTO: 0 %
LDLC SERPL CALC-MCNC: 150 MG/DL (ref 0–99)
LYMPHOCYTES # BLD AUTO: 2.4 X10E3/UL (ref 0.7–3.1)
LYMPHOCYTES NFR BLD AUTO: 45 %
MCH RBC QN AUTO: 30.6 PG (ref 26.6–33)
MCHC RBC AUTO-ENTMCNC: 34.2 G/DL (ref 31.5–35.7)
MCV RBC AUTO: 89 FL (ref 79–97)
MONOCYTES # BLD AUTO: 0.4 X10E3/UL (ref 0.1–0.9)
MONOCYTES NFR BLD AUTO: 8 %
NEUTROPHILS # BLD AUTO: 2.3 X10E3/UL (ref 1.4–7)
NEUTROPHILS NFR BLD AUTO: 44 %
PLATELET # BLD AUTO: 202 X10E3/UL (ref 150–450)
POTASSIUM SERPL-SCNC: 4.6 MMOL/L (ref 3.5–5.2)
PROT SERPL-MCNC: 8 G/DL (ref 6–8.5)
RBC # BLD AUTO: 4.9 X10E6/UL (ref 3.77–5.28)
SODIUM SERPL-SCNC: 142 MMOL/L (ref 134–144)
TRIGL SERPL-MCNC: 87 MG/DL (ref 0–149)
TSH SERPL DL<=0.005 MIU/L-ACNC: 2.55 UIU/ML (ref 0.45–4.5)
VLDLC SERPL CALC-MCNC: 16 MG/DL (ref 5–40)
WBC # BLD AUTO: 5.3 X10E3/UL (ref 3.4–10.8)

## 2022-01-06 ENCOUNTER — TELEPHONE (OUTPATIENT)
Dept: FAMILY MEDICINE CLINIC | Facility: CLINIC | Age: 57
End: 2022-01-06

## 2022-01-06 NOTE — TELEPHONE ENCOUNTER
[facility] called because this patient's order is for an US of Thyroid  They only do MRI's in [place]

## 2022-01-18 DIAGNOSIS — E04.1 THYROID NODULE: ICD-10-CM

## 2022-02-14 ENCOUNTER — OFFICE VISIT (OUTPATIENT)
Dept: FAMILY MEDICINE CLINIC | Facility: CLINIC | Age: 57
End: 2022-02-14

## 2022-02-14 VITALS
TEMPERATURE: 97.3 F | WEIGHT: 244.8 LBS | DIASTOLIC BLOOD PRESSURE: 70 MMHG | SYSTOLIC BLOOD PRESSURE: 120 MMHG | HEART RATE: 85 BPM | BODY MASS INDEX: 37.23 KG/M2 | OXYGEN SATURATION: 98 %

## 2022-02-14 DIAGNOSIS — I10 ESSENTIAL HYPERTENSION: Primary | ICD-10-CM

## 2022-02-14 DIAGNOSIS — F40.240 CLAUSTROPHOBIA: ICD-10-CM

## 2022-02-14 DIAGNOSIS — R51.9 WORSENING HEADACHES: ICD-10-CM

## 2022-02-14 DIAGNOSIS — E78.2 MIXED HYPERLIPIDEMIA: ICD-10-CM

## 2022-02-14 DIAGNOSIS — F41.9 ANXIETY: ICD-10-CM

## 2022-02-14 DIAGNOSIS — I10 ESSENTIAL HYPERTENSION: ICD-10-CM

## 2022-02-14 DIAGNOSIS — E11.9 TYPE 2 DIABETES MELLITUS WITHOUT COMPLICATION, WITHOUT LONG-TERM CURRENT USE OF INSULIN: ICD-10-CM

## 2022-02-14 PROCEDURE — 99214 OFFICE O/P EST MOD 30 MIN: CPT | Performed by: FAMILY MEDICINE

## 2022-02-14 RX ORDER — CLONIDINE HYDROCHLORIDE 0.1 MG/1
0.1 TABLET ORAL DAILY
Qty: 30 TABLET | Refills: 11 | Status: SHIPPED | OUTPATIENT
Start: 2022-02-14

## 2022-02-14 RX ORDER — DIAZEPAM 5 MG/1
5 TABLET ORAL 2 TIMES DAILY PRN
Qty: 30 TABLET | Refills: 0 | Status: SHIPPED | OUTPATIENT
Start: 2022-02-14

## 2022-02-14 NOTE — PROGRESS NOTES
Subjective  Answers for HPI/ROS submitted by the patient on 2/7/2022  Please describe your symptoms.: Headache , Sleep disorder  Have you had these symptoms before?: Yes  How long have you been having these symptoms?: Greater than 2 weeks  What is the primary reason for your visit?: Other    Answers for HPI/ROS submitted by the patient on 2/7/2022  Please describe your symptoms.: Headache , Sleep disorder  Have you had these symptoms before?: Yes  How long have you been having these symptoms?: Greater than 2 weeks  What is the primary reason for your visit?: Bebe Paige is a 56 y.o. female.     Chief Complaint   Patient presents with   • Insomnia       History of Present Illness   Patient is complaining of new onset severe headaches.  She describes this as a lightening bolt on the left side of the temple.  It associated with severe stress.    Patient is complaining on anxiety and insomnia due to severe family stress.  Hypertension borderline control.  Hyperlipidemia follow-up.  Due for labs.  Diabetes is controlled at this time.  Last A1c 7.4  Lab due for labs today    The following portions of the patient's history were reviewed and updated as appropriate: allergies, current medications, past family history, past medical history, past social history, past surgical history and problem list.    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)    • Pre-syncope    • Right  thyroid nodule    • Solitary nodule of right lobe of thyroid    • Thoracic back pain    • Visit for routine gyn exam    • Vitamin D deficiency        Past Surgical History:   Procedure Laterality Date   • CHOLECYSTECTOMY     • CHOLECYSTECTOMY         Family History   Problem Relation Age of Onset   • Cancer Mother    • Heart disease Mother    • Diabetes Paternal Grandmother    • Heart disease Maternal Grandmother    • Diabetes Paternal Grandfather    • Breast cancer Neg Hx        Social History     Socioeconomic History   • Marital status:    Tobacco Use   • Smoking status: Former Smoker     Packs/day: 0.50     Start date:      Quit date:      Years since quittin.1   • Smokeless tobacco: Never Used   Substance and Sexual Activity   • Alcohol use: No   • Drug use: No       Current Outpatient Medications on File Prior to Visit   Medication Sig Dispense Refill   • amLODIPine (NORVASC) 5 MG tablet TAKE ONE TABLET BY MOUTH TWICE A DAY 60 tablet 11   • atorvastatin (LIPITOR) 20 MG tablet TAKE ONE TABLET BY MOUTH DAILY 30 tablet 11   • diclofenac sodium (VOTAREN XR) 100 MG 24 hr tablet      • glipizide (GLUCOTROL XL) 5 MG ER tablet Take 1 tablet by mouth Daily. 90 tablet 3   • hydrOXYzine (ATARAX) 25 MG tablet Take 1 tablet by mouth 3 (Three) Times a Day As Needed for Itching. 30 tablet 11   • metFORMIN (GLUCOPHAGE) 1000 MG tablet Take 1 tablet by mouth 2 (Two) Times a Day With Meals. 180 tablet 3   • omeprazole (PrilOSEC) 20 MG capsule Take 1 capsule by mouth Daily. 30 capsule 11   • valsartan-hydrochlorothiazide (DIOVAN-HCT) 320-25 MG per tablet TAKE  A HALF TABLET BY MOUTH IN THE MORNING AND A HALF TABLET BY MOUTH IN THE EVENING     (DOSE INSTILL TABLET BY MOUTH TABLET DAILY) 30 tablet 6     No current facility-administered medications on file prior to visit.       Review of Systems   Neurological: Positive for headache.   Psychiatric/Behavioral: Positive for depressed mood. The patient is  nervous/anxious.        Recent Results (from the past 4704 hour(s))   Hemoglobin A1c    Collection Time: 09/14/21 11:42 AM    Specimen: Blood   Result Value Ref Range    Hemoglobin A1C 7.40 (H) 4.80 - 5.60 %   Comprehensive Metabolic Panel    Collection Time: 09/14/21 11:42 AM    Specimen: Blood   Result Value Ref Range    Glucose 161 (H) 65 - 99 mg/dL    BUN 16 6 - 20 mg/dL    Creatinine 0.69 0.57 - 1.00 mg/dL    eGFR Non African Am 88 >60 mL/min/1.73    eGFR African Am 107 >60 mL/min/1.73    BUN/Creatinine Ratio 23.2 7.0 - 25.0    Sodium 139 136 - 145 mmol/L    Potassium 4.5 3.5 - 5.2 mmol/L    Chloride 102 98 - 107 mmol/L    Total CO2 25.6 22.0 - 29.0 mmol/L    Calcium 10.1 8.6 - 10.5 mg/dL    Total Protein 7.5 6.0 - 8.5 g/dL    Albumin 4.80 3.50 - 5.20 g/dL    Globulin 2.7 gm/dL    A/G Ratio 1.8 g/dL    Total Bilirubin 0.5 0.0 - 1.2 mg/dL    Alkaline Phosphatase 51 39 - 117 U/L    AST (SGOT) 23 1 - 32 U/L    ALT (SGPT) 44 (H) 1 - 33 U/L   Lipid Panel With LDL / HDL Ratio    Collection Time: 09/14/21 11:42 AM    Specimen: Blood   Result Value Ref Range    Total Cholesterol 196 0 - 200 mg/dL    Triglycerides 148 0 - 150 mg/dL    HDL Cholesterol 36 (L) 40 - 60 mg/dL    VLDL Cholesterol Bamhan 27 5 - 40 mg/dL    LDL Chol Calc (NIH) 133 (H) 0 - 100 mg/dL    LDL/HDL RATIO 3.62    TSH Rfx On Abnormal To Free T4    Collection Time: 12/14/21 11:18 AM    Specimen: Blood   Result Value Ref Range    TSH 2.550 0.450 - 4.500 uIU/mL   Comprehensive Metabolic Panel    Collection Time: 12/14/21 11:18 AM    Specimen: Blood   Result Value Ref Range    Glucose 159 (H) 65 - 99 mg/dL    BUN 16 6 - 24 mg/dL    Creatinine 0.74 0.57 - 1.00 mg/dL    eGFR Non African Am 91 >59 mL/min/1.73    eGFR African Am 105 >59 mL/min/1.73    BUN/Creatinine Ratio 22 9 - 23    Sodium 142 134 - 144 mmol/L    Potassium 4.6 3.5 - 5.2 mmol/L    Chloride 103 96 - 106 mmol/L    Total CO2 22 20 - 29 mmol/L    Calcium 10.1 8.7 - 10.2 mg/dL    Total Protein 8.0  6.0 - 8.5 g/dL    Albumin 4.9 3.8 - 4.9 g/dL    Globulin 3.1 1.5 - 4.5 g/dL    A/G Ratio 1.6 1.2 - 2.2    Total Bilirubin 0.4 0.0 - 1.2 mg/dL    Alkaline Phosphatase 44 44 - 121 IU/L    AST (SGOT) 33 0 - 40 IU/L    ALT (SGPT) 60 (H) 0 - 32 IU/L   Lipid Panel    Collection Time: 12/14/21 11:18 AM    Specimen: Blood   Result Value Ref Range    Total Cholesterol 209 (H) 100 - 199 mg/dL    Triglycerides 87 0 - 149 mg/dL    HDL Cholesterol 43 >39 mg/dL    VLDL Cholesterol Bahman 16 5 - 40 mg/dL    LDL Chol Calc (NIH) 150 (H) 0 - 99 mg/dL   CBC & Differential    Collection Time: 12/14/21 11:18 AM    Specimen: Blood   Result Value Ref Range    WBC 5.3 3.4 - 10.8 x10E3/uL    RBC 4.90 3.77 - 5.28 x10E6/uL    Hemoglobin 15.0 11.1 - 15.9 g/dL    Hematocrit 43.8 34.0 - 46.6 %    MCV 89 79 - 97 fL    MCH 30.6 26.6 - 33.0 pg    MCHC 34.2 31.5 - 35.7 g/dL    RDW 12.5 11.7 - 15.4 %    Platelets 202 150 - 450 x10E3/uL    Neutrophil Rel % 44 Not Estab. %    Lymphocyte Rel % 45 Not Estab. %    Monocyte Rel % 8 Not Estab. %    Eosinophil Rel % 2 Not Estab. %    Basophil Rel % 1 Not Estab. %    Neutrophils Absolute 2.3 1.4 - 7.0 x10E3/uL    Lymphocytes Absolute 2.4 0.7 - 3.1 x10E3/uL    Monocytes Absolute 0.4 0.1 - 0.9 x10E3/uL    Eosinophils Absolute 0.1 0.0 - 0.4 x10E3/uL    Basophils Absolute 0.0 0.0 - 0.2 x10E3/uL    Immature Granulocyte Rel % 0 Not Estab. %    Immature Grans Absolute 0.0 0.0 - 0.1 x10E3/uL     Objective   Vitals:    02/14/22 1622   BP: 120/70   BP Location: Right arm   Patient Position: Sitting   Pulse: 85   Temp: 97.3 °F (36.3 °C)   SpO2: 98%   Weight: 111 kg (244 lb 12.8 oz)     Body mass index is 37.23 kg/m².  Physical Exam  Vitals and nursing note reviewed.   Constitutional:       General: She is not in acute distress.     Appearance: She is well-developed. She is not diaphoretic.   Cardiovascular:      Rate and Rhythm: Normal rate and regular rhythm.   Pulmonary:      Effort: Pulmonary effort is normal. No  respiratory distress.      Breath sounds: Normal breath sounds. No wheezing.   Neurological:      General: No focal deficit present.      Mental Status: She is oriented to person, place, and time.           Diagnoses and all orders for this visit:    1. Essential hypertension (Primary)  -     cloNIDine (Catapres) 0.1 MG tablet; Take 1 tablet by mouth Daily.  Dispense: 30 tablet; Refill: 11  -     Comprehensive Metabolic Panel  -     Lipid Panel With LDL / HDL Ratio    2. Mixed hyperlipidemia  -     Comprehensive Metabolic Panel  -     Lipid Panel With LDL / HDL Ratio    3. Type 2 diabetes mellitus without complication, without long-term current use of insulin (HCC)  -     Hemoglobin A1c  -     Comprehensive Metabolic Panel  -     Lipid Panel With LDL / HDL Ratio    4. Anxiety  -     diazePAM (Valium) 5 MG tablet; Take 1 tablet by mouth 2 (Two) Times a Day As Needed for Anxiety.  Dispense: 30 tablet; Refill: 0    5. Worsening headaches  -     MRI Brain Without Contrast; Future    6. Claustrophobia    7. Essential hypertension  Comments:  Stable.  Continue current medications.  Labs today.  Patient wants to have clonidine refilled for emergencies  Orders:  -     cloNIDine (Catapres) 0.1 MG tablet; Take 1 tablet by mouth Daily.  Dispense: 30 tablet; Refill: 11  -     Comprehensive Metabolic Panel  -     Lipid Panel With LDL / HDL Ratio      At this time patient decided not to start any daily antidepressant or antianxiety.  We will continue Valium as needed    Return in about 3 months (around 5/14/2022) for DIABETES, HYPERTENSION.

## 2022-06-01 DIAGNOSIS — E11.9 TYPE 2 DIABETES MELLITUS WITHOUT COMPLICATION, WITHOUT LONG-TERM CURRENT USE OF INSULIN: ICD-10-CM

## 2022-06-01 RX ORDER — GLIPIZIDE 5 MG/1
TABLET, FILM COATED, EXTENDED RELEASE ORAL
Qty: 30 TABLET | Refills: 0 | Status: SHIPPED | OUTPATIENT
Start: 2022-06-01 | End: 2022-08-18 | Stop reason: SDUPTHER

## 2022-06-01 NOTE — TELEPHONE ENCOUNTER
Rx Refill Note  Requested Prescriptions     Pending Prescriptions Disp Refills   • glipizide (GLUCOTROL XL) 5 MG ER tablet [Pharmacy Med Name: glipiZIDE XL 5 MG TABLET] 30 tablet      Sig: TAKE ONE TABLET BY MOUTH DAILY      Last office visit with prescribing clinician: 2/14/2022      Next office visit with prescribing clinician: Visit date not found            Dodie Brown MA  06/01/22, 08:16 EDT     Last Filled 12/14/21

## 2022-06-02 ENCOUNTER — TELEPHONE (OUTPATIENT)
Dept: FAMILY MEDICINE CLINIC | Facility: CLINIC | Age: 57
End: 2022-06-02

## 2022-06-02 DIAGNOSIS — M54.6 CHRONIC MIDLINE THORACIC BACK PAIN: Primary | ICD-10-CM

## 2022-06-02 DIAGNOSIS — G89.29 CHRONIC MIDLINE THORACIC BACK PAIN: Primary | ICD-10-CM

## 2022-06-02 DIAGNOSIS — G89.29 CHRONIC RADICULAR LUMBAR PAIN: ICD-10-CM

## 2022-06-02 DIAGNOSIS — M54.16 CHRONIC RADICULAR LUMBAR PAIN: ICD-10-CM

## 2022-06-02 DIAGNOSIS — M51.36 DEGENERATIVE DISC DISEASE, LUMBAR: Primary | ICD-10-CM

## 2022-06-02 NOTE — TELEPHONE ENCOUNTER
Spoke to patient she want an MRI of her thoracic spine and she would like her order be sent to [facility] [address]

## 2022-06-02 NOTE — TELEPHONE ENCOUNTER
Patient called and is wanting a MRI of her back, she says her back pain is getting worse.  She says she has talked to Dr Freitas regarding this before and wants to know if Dr Freitas could put the order in please.  Her phone number is [phone].

## 2022-06-10 ENCOUNTER — TELEPHONE (OUTPATIENT)
Dept: FAMILY MEDICINE CLINIC | Facility: CLINIC | Age: 57
End: 2022-06-10

## 2022-06-10 DIAGNOSIS — M50.322 DEGENERATION OF INTERVERTEBRAL DISC AT C5-C6 LEVEL: ICD-10-CM

## 2022-06-10 DIAGNOSIS — M54.6 CHRONIC MIDLINE THORACIC BACK PAIN: ICD-10-CM

## 2022-06-10 DIAGNOSIS — G89.29 CHRONIC RADICULAR LUMBAR PAIN: Primary | ICD-10-CM

## 2022-06-10 DIAGNOSIS — M54.16 CHRONIC RADICULAR LUMBAR PAIN: Primary | ICD-10-CM

## 2022-06-10 DIAGNOSIS — M54.2 NECK PAIN: ICD-10-CM

## 2022-06-10 DIAGNOSIS — G89.29 CHRONIC MIDLINE THORACIC BACK PAIN: ICD-10-CM

## 2022-06-10 DIAGNOSIS — M51.36 DDD (DEGENERATIVE DISC DISEASE), LUMBAR: ICD-10-CM

## 2022-06-10 NOTE — TELEPHONE ENCOUNTER
Caller: Skylar Paige    Relationship: Self    Best call back number: [phone]    What orders are you requesting (i.e. lab or imaging): SPINE DOCTOR    In what timeframe would the patient need to come in: AS SOON AS POSIBLE    Where will you receive your lab/imaging services: DR [facility]    Additional notes: PATIENT WOULD LIKE TO HAVE ORDER SENT TO THE ABOVE

## 2022-06-13 ENCOUNTER — TELEPHONE (OUTPATIENT)
Dept: FAMILY MEDICINE CLINIC | Facility: CLINIC | Age: 57
End: 2022-06-13

## 2022-06-13 NOTE — TELEPHONE ENCOUNTER
Caller: Skylar Paige    Relationship: Self    Best call back number: [phone]    Who are you requesting to speak with (clinical staff, provider,  specific staff member): CLINICAL STAFF     What was the call regarding: [facility] NEEDS CLINICAL INFORMATION AND INSURANCE INFORMATION SENT OVER IN ORDER TO SCHEDULED MRI     Do you require a callback: YES

## 2022-06-23 DIAGNOSIS — G89.29 CHRONIC MIDLINE THORACIC BACK PAIN: ICD-10-CM

## 2022-06-23 DIAGNOSIS — M54.6 CHRONIC MIDLINE THORACIC BACK PAIN: ICD-10-CM

## 2022-06-29 DIAGNOSIS — E11.9 TYPE 2 DIABETES MELLITUS WITHOUT COMPLICATION, WITHOUT LONG-TERM CURRENT USE OF INSULIN: ICD-10-CM

## 2022-08-18 ENCOUNTER — OFFICE VISIT (OUTPATIENT)
Dept: FAMILY MEDICINE CLINIC | Facility: CLINIC | Age: 57
End: 2022-08-18

## 2022-08-18 VITALS
SYSTOLIC BLOOD PRESSURE: 137 MMHG | WEIGHT: 243.6 LBS | HEIGHT: 68 IN | DIASTOLIC BLOOD PRESSURE: 86 MMHG | BODY MASS INDEX: 36.92 KG/M2 | HEART RATE: 74 BPM | TEMPERATURE: 95 F | OXYGEN SATURATION: 97 %

## 2022-08-18 DIAGNOSIS — Z12.31 VISIT FOR SCREENING MAMMOGRAM: ICD-10-CM

## 2022-08-18 DIAGNOSIS — I10 ESSENTIAL HYPERTENSION: Primary | ICD-10-CM

## 2022-08-18 DIAGNOSIS — E11.9 TYPE 2 DIABETES MELLITUS WITHOUT COMPLICATION, WITHOUT LONG-TERM CURRENT USE OF INSULIN: ICD-10-CM

## 2022-08-18 DIAGNOSIS — E04.1 THYROID NODULE: ICD-10-CM

## 2022-08-18 DIAGNOSIS — E55.9 VITAMIN D DEFICIENCY: ICD-10-CM

## 2022-08-18 DIAGNOSIS — R10.13 EPIGASTRIC PAIN: ICD-10-CM

## 2022-08-18 DIAGNOSIS — E78.5 HYPERLIPIDEMIA, UNSPECIFIED HYPERLIPIDEMIA TYPE: ICD-10-CM

## 2022-08-18 PROCEDURE — 99214 OFFICE O/P EST MOD 30 MIN: CPT | Performed by: FAMILY MEDICINE

## 2022-08-18 RX ORDER — VALSARTAN AND HYDROCHLOROTHIAZIDE 320; 25 MG/1; MG/1
1 TABLET, FILM COATED ORAL DAILY
Qty: 30 TABLET | Refills: 11 | Status: SHIPPED | OUTPATIENT
Start: 2022-08-18

## 2022-08-18 RX ORDER — ATORVASTATIN CALCIUM 20 MG/1
20 TABLET, FILM COATED ORAL DAILY
Qty: 30 TABLET | Refills: 11 | Status: SHIPPED | OUTPATIENT
Start: 2022-08-18

## 2022-08-18 RX ORDER — AMLODIPINE BESYLATE 5 MG/1
5 TABLET ORAL 2 TIMES DAILY
Qty: 60 TABLET | Refills: 11 | Status: SHIPPED | OUTPATIENT
Start: 2022-08-18

## 2022-08-18 RX ORDER — GLIPIZIDE 5 MG/1
5 TABLET, FILM COATED, EXTENDED RELEASE ORAL DAILY
Qty: 30 TABLET | Refills: 11 | Status: SHIPPED | OUTPATIENT
Start: 2022-08-18 | End: 2022-12-19

## 2022-08-18 NOTE — PROGRESS NOTES
Subjective  Answers for HPI/ROS submitted by the patient on 8/16/2022  What is the primary reason for your visit?: Diabetes      Skylar Paige is a 57 y.o. female.     Chief Complaint   Patient presents with   • Diabetes   • Hypertension       History of Present Illness   Chronic diabetes follow-up.  Stable on current medications.  Due for labs.  Hypertension is stable on current medications.  Due for labs.  Hyperlipidemia follow-up on statins.  Due for labs.  Vitamin D deficiency follow-up stable.  Thyroid disease follow-up due for labs.  Patient is a new complaint of epigastric discomfort more heartburn and reflux.  Will initiate work-up today    The following portions of the patient's history were reviewed and updated as appropriate: allergies, current medications, past family history, past medical history, past social history, past surgical history and problem list.    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)    • Pre-syncope    • Right thyroid nodule    • Solitary nodule of right lobe of thyroid    • Thoracic back pain    • Visit for routine gyn exam    • Vitamin D deficiency        Past Surgical History:   Procedure Laterality Date   • CHOLECYSTECTOMY     • CHOLECYSTECTOMY     • COLONOSCOPY         Family History   Problem Relation Age of Onset   • Cancer Mother    • Heart disease Mother    • Hypertension Mother    • Thyroid disease Mother    • Diabetes  Paternal Grandmother    • Heart disease Maternal Grandmother    • Hypertension Maternal Grandmother    • Diabetes Paternal Grandfather    • Breast cancer Neg Hx        Social History     Socioeconomic History   • Marital status:    Tobacco Use   • Smoking status: Former Smoker     Packs/day: 0.25     Years: 10.00     Pack years: 2.50     Start date:      Quit date:      Years since quittin.6   • Smokeless tobacco: Never Used   Substance and Sexual Activity   • Alcohol use: No   • Drug use: No   • Sexual activity: Not Currently     Birth control/protection: None       Current Outpatient Medications on File Prior to Visit   Medication Sig Dispense Refill   • cloNIDine (Catapres) 0.1 MG tablet Take 1 tablet by mouth Daily. 30 tablet 11   • diazePAM (Valium) 5 MG tablet Take 1 tablet by mouth 2 (Two) Times a Day As Needed for Anxiety. 30 tablet 0   • diclofenac sodium (VOTAREN XR) 100 MG 24 hr tablet      • hydrOXYzine (ATARAX) 25 MG tablet Take 1 tablet by mouth 3 (Three) Times a Day As Needed for Itching. 30 tablet 11   • metFORMIN (GLUCOPHAGE) 1000 MG tablet TAKE ONE TABLET BY MOUTH TWICE A DAY WITH MEALS 60 tablet 1   • omeprazole (PrilOSEC) 20 MG capsule Take 1 capsule by mouth Daily. 30 capsule 11     No current facility-administered medications on file prior to visit.       Review of Systems   Constitutional: Positive for fatigue. Negative for unexpected weight loss.   Eyes: Negative for blurred vision.   Cardiovascular: Negative for chest pain.   Gastrointestinal: Positive for abdominal pain, GERD and indigestion.   Endocrine: Positive for polydipsia. Negative for polyphagia and polyuria.   Musculoskeletal: Positive for arthralgias and back pain.   Skin: Negative for pallor.   Neurological: Positive for weakness. Negative for dizziness, tremors, seizures, speech difficulty and confusion.   Psychiatric/Behavioral: The patient is nervous/anxious.        No results found for this or any previous  "visit (from the past 4704 hour(s)).  Objective   Vitals:    08/18/22 1047   BP: 137/86   BP Location: Right arm   Patient Position: Sitting   Pulse: 74   Temp: 95 °F (35 °C)   SpO2: 97%   Weight: 110 kg (243 lb 9.6 oz)   Height: 172.7 cm (67.99\")     Body mass index is 37.05 kg/m².  Physical Exam  Vitals and nursing note reviewed.   Constitutional:       General: She is not in acute distress.     Appearance: She is well-developed. She is not diaphoretic.   Cardiovascular:      Rate and Rhythm: Normal rate and regular rhythm.   Pulmonary:      Effort: Pulmonary effort is normal. No respiratory distress.      Breath sounds: Normal breath sounds. No wheezing.           Diagnoses and all orders for this visit:    1. Essential hypertension (Primary)  -     amLODIPine (NORVASC) 5 MG tablet; Take 1 tablet by mouth 2 (Two) Times a Day.  Dispense: 60 tablet; Refill: 11  -     valsartan-hydrochlorothiazide (DIOVAN-HCT) 320-25 MG per tablet; Take 1 tablet by mouth Daily.  Dispense: 30 tablet; Refill: 11    2. Hyperlipidemia, unspecified hyperlipidemia type  -     atorvastatin (LIPITOR) 20 MG tablet; Take 1 tablet by mouth Daily.  Dispense: 30 tablet; Refill: 11    3. Type 2 diabetes mellitus without complication, without long-term current use of insulin (HCC)  -     Hemoglobin A1c  -     Comprehensive Metabolic Panel  -     Lipid Panel With LDL / HDL Ratio  -     Microalbumin / Creatinine Urine Ratio - Urine, Clean Catch  -     glipizide (GLUCOTROL XL) 5 MG ER tablet; Take 1 tablet by mouth Daily.  Dispense: 30 tablet; Refill: 11  -     CBC & Differential    4. Vitamin D deficiency  -     Vitamin D 25 Hydroxy    5. Thyroid nodule  -     TSH    6. Epigastric pain  -     Amylase  -     Lipase  -     Occult Blood, Fecal By Immunoassay - Stool, Per Rectum  -     H. Pylori Antigen, Stool - Stool, Per Rectum    7. Visit for screening mammogram  -     Mammo Screening Digital Tomosynthesis Bilateral With CAD; Future      Return in " about 3 months (around 11/18/2022).

## 2022-08-19 LAB
25(OH)D3+25(OH)D2 SERPL-MCNC: 32.5 NG/ML (ref 30–100)
ALBUMIN SERPL-MCNC: 4.7 G/DL (ref 3.8–4.9)
ALBUMIN/CREAT UR: 8 MG/G CREAT (ref 0–29)
ALBUMIN/GLOB SERPL: 1.7 {RATIO} (ref 1.2–2.2)
ALP SERPL-CCNC: 52 IU/L (ref 44–121)
ALT SERPL-CCNC: 86 IU/L (ref 0–32)
AMYLASE SERPL-CCNC: 66 U/L (ref 31–110)
AST SERPL-CCNC: 49 IU/L (ref 0–40)
BASOPHILS # BLD AUTO: 0 X10E3/UL (ref 0–0.2)
BASOPHILS NFR BLD AUTO: 1 %
BILIRUB SERPL-MCNC: 0.6 MG/DL (ref 0–1.2)
BUN SERPL-MCNC: 17 MG/DL (ref 6–24)
BUN/CREAT SERPL: 23 (ref 9–23)
CALCIUM SERPL-MCNC: 9.8 MG/DL (ref 8.7–10.2)
CHLORIDE SERPL-SCNC: 100 MMOL/L (ref 96–106)
CHOLEST SERPL-MCNC: 204 MG/DL (ref 100–199)
CO2 SERPL-SCNC: 22 MMOL/L (ref 20–29)
CREAT SERPL-MCNC: 0.73 MG/DL (ref 0.57–1)
CREAT UR-MCNC: 195 MG/DL
EGFRCR-CYS SERPLBLD CKD-EPI 2021: 96 ML/MIN/1.73
EOSINOPHIL # BLD AUTO: 0.1 X10E3/UL (ref 0–0.4)
EOSINOPHIL NFR BLD AUTO: 2 %
ERYTHROCYTE [DISTWIDTH] IN BLOOD BY AUTOMATED COUNT: 13.5 % (ref 11.7–15.4)
GLOBULIN SER CALC-MCNC: 2.8 G/DL (ref 1.5–4.5)
GLUCOSE SERPL-MCNC: 184 MG/DL (ref 65–99)
HBA1C MFR BLD: 8.2 % (ref 4.8–5.6)
HCT VFR BLD AUTO: 43.4 % (ref 34–46.6)
HDLC SERPL-MCNC: 40 MG/DL
HGB BLD-MCNC: 14.5 G/DL (ref 11.1–15.9)
IMM GRANULOCYTES # BLD AUTO: 0 X10E3/UL (ref 0–0.1)
IMM GRANULOCYTES NFR BLD AUTO: 0 %
LDLC SERPL CALC-MCNC: 137 MG/DL (ref 0–99)
LDLC/HDLC SERPL: 3.4 RATIO (ref 0–3.2)
LIPASE SERPL-CCNC: 57 U/L (ref 14–72)
LYMPHOCYTES # BLD AUTO: 2.3 X10E3/UL (ref 0.7–3.1)
LYMPHOCYTES NFR BLD AUTO: 43 %
MCH RBC QN AUTO: 29.1 PG (ref 26.6–33)
MCHC RBC AUTO-ENTMCNC: 33.4 G/DL (ref 31.5–35.7)
MCV RBC AUTO: 87 FL (ref 79–97)
MICROALBUMIN UR-MCNC: 15.8 UG/ML
MONOCYTES # BLD AUTO: 0.4 X10E3/UL (ref 0.1–0.9)
MONOCYTES NFR BLD AUTO: 8 %
NEUTROPHILS # BLD AUTO: 2.4 X10E3/UL (ref 1.4–7)
NEUTROPHILS NFR BLD AUTO: 46 %
PLATELET # BLD AUTO: 211 X10E3/UL (ref 150–450)
POTASSIUM SERPL-SCNC: 4.2 MMOL/L (ref 3.5–5.2)
PROT SERPL-MCNC: 7.5 G/DL (ref 6–8.5)
RBC # BLD AUTO: 4.98 X10E6/UL (ref 3.77–5.28)
SODIUM SERPL-SCNC: 139 MMOL/L (ref 134–144)
TRIGL SERPL-MCNC: 148 MG/DL (ref 0–149)
TSH SERPL DL<=0.005 MIU/L-ACNC: 1.67 UIU/ML (ref 0.45–4.5)
VLDLC SERPL CALC-MCNC: 27 MG/DL (ref 5–40)
WBC # BLD AUTO: 5.2 X10E3/UL (ref 3.4–10.8)

## 2022-09-13 ENCOUNTER — HOSPITAL ENCOUNTER (OUTPATIENT)
Dept: MAMMOGRAPHY | Facility: HOSPITAL | Age: 57
Discharge: HOME | End: 2022-09-13
Admitting: FAMILY MEDICINE

## 2022-09-13 DIAGNOSIS — Z12.31 VISIT FOR SCREENING MAMMOGRAM: ICD-10-CM

## 2022-09-13 PROCEDURE — 77067 SCR MAMMO BI INCL CAD: CPT

## 2022-09-13 PROCEDURE — 77063 BREAST TOMOSYNTHESIS BI: CPT

## 2022-12-12 ENCOUNTER — TELEPHONE (OUTPATIENT)
Dept: FAMILY MEDICINE CLINIC | Facility: CLINIC | Age: 57
End: 2022-12-12

## 2022-12-12 NOTE — TELEPHONE ENCOUNTER
Caller: Skylar Paige    Relationship: Self    Best call back number:[phone] (Mobile)        What was the call regarding: PATIENT IS WANTING TO SEE DR NIETO BEFORE SHE LEAVES -- REGARDING HER DIABETES *    Do you require a callback: YES PLEASE

## 2022-12-19 ENCOUNTER — OFFICE VISIT (OUTPATIENT)
Dept: FAMILY MEDICINE CLINIC | Facility: CLINIC | Age: 57
End: 2022-12-19

## 2022-12-19 VITALS
SYSTOLIC BLOOD PRESSURE: 135 MMHG | BODY MASS INDEX: 37.41 KG/M2 | HEART RATE: 85 BPM | DIASTOLIC BLOOD PRESSURE: 84 MMHG | TEMPERATURE: 98.7 F | WEIGHT: 246 LBS | OXYGEN SATURATION: 96 %

## 2022-12-19 DIAGNOSIS — G89.29 CHRONIC PAIN OF RIGHT ANKLE: ICD-10-CM

## 2022-12-19 DIAGNOSIS — K21.9 GASTROESOPHAGEAL REFLUX DISEASE WITHOUT ESOPHAGITIS: ICD-10-CM

## 2022-12-19 DIAGNOSIS — I10 ESSENTIAL HYPERTENSION: ICD-10-CM

## 2022-12-19 DIAGNOSIS — M19.90 ARTHRITIS: ICD-10-CM

## 2022-12-19 DIAGNOSIS — M54.6 CHRONIC MIDLINE THORACIC BACK PAIN: ICD-10-CM

## 2022-12-19 DIAGNOSIS — M51.24 THORACIC DISC HERNIATION: ICD-10-CM

## 2022-12-19 DIAGNOSIS — E11.9 TYPE 2 DIABETES MELLITUS WITHOUT COMPLICATION, WITHOUT LONG-TERM CURRENT USE OF INSULIN: Primary | ICD-10-CM

## 2022-12-19 DIAGNOSIS — E55.9 VITAMIN D DEFICIENCY: ICD-10-CM

## 2022-12-19 DIAGNOSIS — M25.571 CHRONIC PAIN OF RIGHT ANKLE: ICD-10-CM

## 2022-12-19 DIAGNOSIS — R10.13 EPIGASTRIC PAIN: ICD-10-CM

## 2022-12-19 DIAGNOSIS — E78.2 MIXED HYPERLIPIDEMIA: ICD-10-CM

## 2022-12-19 DIAGNOSIS — G89.29 CHRONIC MIDLINE THORACIC BACK PAIN: ICD-10-CM

## 2022-12-19 PROCEDURE — 99214 OFFICE O/P EST MOD 30 MIN: CPT | Performed by: FAMILY MEDICINE

## 2022-12-19 RX ORDER — GLIPIZIDE 5 MG/1
5 TABLET ORAL
Qty: 60 TABLET | Refills: 11 | Status: SHIPPED | OUTPATIENT
Start: 2022-12-19

## 2022-12-19 NOTE — PROGRESS NOTES
Subjective   Skylar Paige is a 57 y.o. female.     Chief Complaint   Patient presents with   • Diabetes       History of Present Illness   Diabetes follow-up.  Patient reports several episodes of hypoglycemia especially with exercise and her glipizide ER 5 mg  Upper lipidemia follow-up due for labs.  Hypertension follow-up stable on current medications.  Vitamin D deficiency stable on supplements.  Chronic midline thoracic back pain with thoracic disc herniation becoming worse and intractable.  Patient also is complaining on a chronic right ankle pain, she sustained 2 different injuries during her childhood and now is feeling more pain especially associated with her back pain  Is also here to follow-up on epigastric pain for which she never completed her H. pylori testing  The following portions of the patient's history were reviewed and updated as appropriate: allergies, current medications, past family history, past medical history, past social history, past surgical history and problem list.    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)    • Pre-syncope    • Right thyroid nodule    • Solitary nodule of right lobe of thyroid    • Thoracic back pain    • Visit for routine gyn exam    • Vitamin D deficiency        Past Surgical History:   Procedure Laterality Date   • CHOLECYSTECTOMY     • CHOLECYSTECTOMY     • COLONOSCOPY         Family History   Problem  Relation Age of Onset   • Cancer Mother    • Heart disease Mother    • Hypertension Mother    • Thyroid disease Mother    • Diabetes Paternal Grandmother    • Heart disease Maternal Grandmother    • Hypertension Maternal Grandmother    • Diabetes Paternal Grandfather    • Breast cancer Neg Hx        Social History     Socioeconomic History   • Marital status:    Tobacco Use   • Smoking status: Former     Packs/day: 0.25     Years: 10.00     Pack years: 2.50     Types: Cigarettes     Start date: 1990     Quit date: 2015     Years since quittin.9   • Smokeless tobacco: Never   Substance and Sexual Activity   • Alcohol use: No   • Drug use: No   • Sexual activity: Not Currently     Birth control/protection: None       Current Outpatient Medications on File Prior to Visit   Medication Sig Dispense Refill   • amLODIPine (NORVASC) 5 MG tablet Take 1 tablet by mouth 2 (Two) Times a Day. 60 tablet 11   • atorvastatin (LIPITOR) 20 MG tablet Take 1 tablet by mouth Daily. 30 tablet 11   • cloNIDine (Catapres) 0.1 MG tablet Take 1 tablet by mouth Daily. 30 tablet 11   • diazePAM (Valium) 5 MG tablet Take 1 tablet by mouth 2 (Two) Times a Day As Needed for Anxiety. 30 tablet 0   • hydrOXYzine (ATARAX) 25 MG tablet Take 1 tablet by mouth 3 (Three) Times a Day As Needed for Itching. 30 tablet 11   • omeprazole (PrilOSEC) 20 MG capsule Take 1 capsule by mouth Daily. 30 capsule 11   • valsartan-hydrochlorothiazide (DIOVAN-HCT) 320-25 MG per tablet Take 1 tablet by mouth Daily. 30 tablet 11     No current facility-administered medications on file prior to visit.       Review of Systems   Constitutional: Negative.    Eyes: Negative for blurred vision.   Gastrointestinal: Positive for abdominal pain and GERD.   Musculoskeletal: Positive for arthralgias and back pain.   Neurological: Negative for confusion.   Psychiatric/Behavioral: The patient is nervous/anxious.        Recent Results (from the past 4704 hour(s))    TSH    Collection Time: 08/18/22 12:05 PM    Specimen: Blood   Result Value Ref Range    TSH 1.670 0.450 - 4.500 uIU/mL   Hemoglobin A1c    Collection Time: 08/18/22 12:05 PM    Specimen: Blood   Result Value Ref Range    Hemoglobin A1C 8.2 (H) 4.8 - 5.6 %   Comprehensive Metabolic Panel    Collection Time: 08/18/22 12:05 PM    Specimen: Blood   Result Value Ref Range    Glucose 184 (H) 65 - 99 mg/dL    BUN 17 6 - 24 mg/dL    Creatinine 0.73 0.57 - 1.00 mg/dL    EGFR Result 96 >59 mL/min/1.73    BUN/Creatinine Ratio 23 9 - 23    Sodium 139 134 - 144 mmol/L    Potassium 4.2 3.5 - 5.2 mmol/L    Chloride 100 96 - 106 mmol/L    Total CO2 22 20 - 29 mmol/L    Calcium 9.8 8.7 - 10.2 mg/dL    Total Protein 7.5 6.0 - 8.5 g/dL    Albumin 4.7 3.8 - 4.9 g/dL    Globulin 2.8 1.5 - 4.5 g/dL    A/G Ratio 1.7 1.2 - 2.2    Total Bilirubin 0.6 0.0 - 1.2 mg/dL    Alkaline Phosphatase 52 44 - 121 IU/L    AST (SGOT) 49 (H) 0 - 40 IU/L    ALT (SGPT) 86 (H) 0 - 32 IU/L   Lipid Panel With LDL / HDL Ratio    Collection Time: 08/18/22 12:05 PM    Specimen: Blood   Result Value Ref Range    Total Cholesterol 204 (H) 100 - 199 mg/dL    Triglycerides 148 0 - 149 mg/dL    HDL Cholesterol 40 >39 mg/dL    VLDL Cholesterol Bahman 27 5 - 40 mg/dL    LDL Chol Calc (NIH) 137 (H) 0 - 99 mg/dL    LDL/HDL RATIO 3.4 (H) 0.0 - 3.2 ratio   Microalbumin / Creatinine Urine Ratio - Urine, Clean Catch    Collection Time: 08/18/22 12:05 PM    Specimen: Urine, Clean Catch   Result Value Ref Range    Creatinine, Urine 195.0 Not Estab. mg/dL    Microalbumin, Urine 15.8 Not Estab. ug/mL    Microalbumin/Creatinine Ratio 8 0 - 29 mg/g creat   CBC & Differential    Collection Time: 08/18/22 12:05 PM    Specimen: Blood   Result Value Ref Range    WBC 5.2 3.4 - 10.8 x10E3/uL    RBC 4.98 3.77 - 5.28 x10E6/uL    Hemoglobin 14.5 11.1 - 15.9 g/dL    Hematocrit 43.4 34.0 - 46.6 %    MCV 87 79 - 97 fL    MCH 29.1 26.6 - 33.0 pg    MCHC 33.4 31.5 - 35.7 g/dL    RDW 13.5 11.7 -  15.4 %    Platelets 211 150 - 450 x10E3/uL    Neutrophil Rel % 46 Not Estab. %    Lymphocyte Rel % 43 Not Estab. %    Monocyte Rel % 8 Not Estab. %    Eosinophil Rel % 2 Not Estab. %    Basophil Rel % 1 Not Estab. %    Neutrophils Absolute 2.4 1.4 - 7.0 x10E3/uL    Lymphocytes Absolute 2.3 0.7 - 3.1 x10E3/uL    Monocytes Absolute 0.4 0.1 - 0.9 x10E3/uL    Eosinophils Absolute 0.1 0.0 - 0.4 x10E3/uL    Basophils Absolute 0.0 0.0 - 0.2 x10E3/uL    Immature Granulocyte Rel % 0 Not Estab. %    Immature Grans Absolute 0.0 0.0 - 0.1 x10E3/uL   Vitamin D 25 Hydroxy    Collection Time: 08/18/22 12:05 PM    Specimen: Blood   Result Value Ref Range    25 Hydroxy, Vitamin D 32.5 30.0 - 100.0 ng/mL   Amylase    Collection Time: 08/18/22 12:05 PM    Specimen: Blood   Result Value Ref Range    Amylase 66 31 - 110 U/L   Lipase    Collection Time: 08/18/22 12:05 PM    Specimen: Blood   Result Value Ref Range    Lipase 57 14 - 72 U/L     Objective   Vitals:    12/19/22 1545   BP: 135/84   BP Location: Left arm   Patient Position: Sitting   Cuff Size: Large Adult   Pulse: 85   Temp: 98.7 °F (37.1 °C)   TempSrc: Temporal   SpO2: 96%   Weight: 112 kg (246 lb)     Body mass index is 37.41 kg/m².  Physical Exam  Vitals and nursing note reviewed.   Constitutional:       General: She is not in acute distress.     Appearance: She is well-developed. She is not diaphoretic.   Cardiovascular:      Rate and Rhythm: Normal rate and regular rhythm.   Pulmonary:      Effort: Pulmonary effort is normal. No respiratory distress.      Breath sounds: Normal breath sounds. No wheezing.   Musculoskeletal:      Comments: Right ankle soft tissues edema compared to the left ankle  She does have pain with range of motion           Diagnoses and all orders for this visit:    1. Type 2 diabetes mellitus without complication, without long-term current use of insulin (HCC) (Primary)  -     glipizide (Glucotrol) 5 MG tablet; Take 1 tablet by mouth 2 (Two) Times  a Day Before Meals.  Dispense: 60 tablet; Refill: 11  -     metFORMIN (Glucophage) 1000 MG tablet; TAKE 1 PILL PO Q AM  AND 1 1/2 PO QHS  Dispense: 225 tablet; Refill: 3  -     Hemoglobin A1c  -     Comprehensive Metabolic Panel  -     Lipid Panel With LDL / HDL Ratio    2. Mixed hyperlipidemia  -     Comprehensive Metabolic Panel  -     Lipid Panel With LDL / HDL Ratio    3. Essential hypertension  -     Comprehensive Metabolic Panel  -     Lipid Panel With LDL / HDL Ratio    4. Vitamin D deficiency    5. Gastroesophageal reflux disease without esophagitis    6. Chronic midline thoracic back pain  -     Ambulatory Referral to Neurosurgery    7. Thoracic disc herniation  -     Ambulatory Referral to Neurosurgery    8. Chronic pain of right ankle  -     Ambulatory Referral to Orthopedic Surgery    9. Arthritis  -     Ambulatory Referral to Orthopedic Surgery    10. Epigastric pain  -     H. Pylori Antigen, Stool - Stool, Per Rectum      Return in about 2 months (around 2/19/2023) for DIABETES.          Answers for HPI/ROS submitted by the patient on 12/13/2022  What is the primary reason for your visit?: Diabetes

## 2022-12-20 PROBLEM — G89.29 CHRONIC PAIN OF RIGHT ANKLE: Status: ACTIVE | Noted: 2022-12-20

## 2022-12-20 PROBLEM — M25.571 CHRONIC PAIN OF RIGHT ANKLE: Status: ACTIVE | Noted: 2022-12-20

## 2022-12-20 PROBLEM — M19.90 ARTHRITIS: Status: ACTIVE | Noted: 2022-12-20

## 2022-12-20 PROBLEM — M51.24 THORACIC DISC HERNIATION: Status: ACTIVE | Noted: 2022-12-20

## 2022-12-20 LAB
ALBUMIN SERPL-MCNC: 4.9 G/DL (ref 3.5–5.2)
ALBUMIN/GLOB SERPL: 1.6 G/DL
ALP SERPL-CCNC: 69 U/L (ref 39–117)
ALT SERPL-CCNC: 92 U/L (ref 1–33)
AST SERPL-CCNC: 57 U/L (ref 1–32)
BILIRUB SERPL-MCNC: 0.4 MG/DL (ref 0–1.2)
BUN SERPL-MCNC: 15 MG/DL (ref 6–20)
BUN/CREAT SERPL: 20.3 (ref 7–25)
CALCIUM SERPL-MCNC: 10.5 MG/DL (ref 8.6–10.5)
CHLORIDE SERPL-SCNC: 102 MMOL/L (ref 98–107)
CHOLEST SERPL-MCNC: 242 MG/DL (ref 0–200)
CO2 SERPL-SCNC: 29.6 MMOL/L (ref 22–29)
CREAT SERPL-MCNC: 0.74 MG/DL (ref 0.57–1)
EGFRCR SERPLBLD CKD-EPI 2021: 95.1 ML/MIN/1.73
GLOBULIN SER CALC-MCNC: 3.1 GM/DL
GLUCOSE SERPL-MCNC: 151 MG/DL (ref 65–99)
HBA1C MFR BLD: 8.9 % (ref 4.8–5.6)
HDLC SERPL-MCNC: 44 MG/DL (ref 40–60)
LDLC SERPL CALC-MCNC: 176 MG/DL (ref 0–100)
LDLC/HDLC SERPL: 3.95 {RATIO}
POTASSIUM SERPL-SCNC: 4.2 MMOL/L (ref 3.5–5.2)
PROT SERPL-MCNC: 8 G/DL (ref 6–8.5)
SODIUM SERPL-SCNC: 139 MMOL/L (ref 136–145)
TRIGL SERPL-MCNC: 120 MG/DL (ref 0–150)
VLDLC SERPL CALC-MCNC: 22 MG/DL (ref 5–40)

## 2022-12-22 DIAGNOSIS — E11.9 TYPE 2 DIABETES MELLITUS WITHOUT COMPLICATION, WITHOUT LONG-TERM CURRENT USE OF INSULIN: Primary | ICD-10-CM

## 2022-12-22 RX ORDER — BLOOD-GLUCOSE METER
1 KIT MISCELLANEOUS DAILY
Qty: 1 EACH | Refills: 0 | Status: SHIPPED | OUTPATIENT
Start: 2022-12-22

## 2023-01-19 ENCOUNTER — OFFICE VISIT (OUTPATIENT)
Dept: ORTHOPEDIC SURGERY | Facility: CLINIC | Age: 58
End: 2023-01-19
Payer: COMMERCIAL

## 2023-01-19 VITALS — WEIGHT: 244 LBS | HEIGHT: 68 IN | BODY MASS INDEX: 36.98 KG/M2 | TEMPERATURE: 97.6 F

## 2023-01-19 DIAGNOSIS — M76.821 POSTERIOR TIBIAL TENDONITIS, RIGHT: Primary | ICD-10-CM

## 2023-01-19 PROCEDURE — 99204 OFFICE O/P NEW MOD 45 MIN: CPT | Performed by: ORTHOPAEDIC SURGERY

## 2023-01-19 PROCEDURE — 73610 X-RAY EXAM OF ANKLE: CPT | Performed by: ORTHOPAEDIC SURGERY

## 2023-01-19 RX ORDER — DICLOFENAC SODIUM 20 MG/G
2 SOLUTION TOPICAL 2 TIMES DAILY
Qty: 112 G | Refills: 12 | Status: SHIPPED | OUTPATIENT
Start: 2023-01-19

## 2023-01-19 RX ORDER — MELOXICAM 15 MG/1
TABLET ORAL
Qty: 30 TABLET | Refills: 1 | Status: SHIPPED | OUTPATIENT
Start: 2023-01-19

## 2023-01-19 NOTE — PROGRESS NOTES
New Patient Complaint      Patient: Skylar Paige  YOB: 1965 57 y.o. female  Medical Record Number: [id number]    Chief Complaints: My ankle hurts    History of Present Illness: Patient reports onset of pain and swelling over the inferomedial aspect of her right hindfoot about a month ago.  She describes her symptoms as moderate to severe worse with walking.    She does have a history of some back problems but denies any numbness or tingling.      She is seen today at the request of Dr. Skylar Freitas who is requested by opinion regarding etiology and treatment of this condition       HPI    Allergies:   Allergies   Allergen Reactions   • Benazepril Rash     unknown       Medications:   Current Outpatient Medications on File Prior to Visit   Medication Sig   • amLODIPine (NORVASC) 5 MG tablet Take 1 tablet by mouth 2 (Two) Times a Day.   • atorvastatin (LIPITOR) 20 MG tablet Take 1 tablet by mouth Daily.   • cloNIDine (Catapres) 0.1 MG tablet Take 1 tablet by mouth Daily.   • diazePAM (Valium) 5 MG tablet Take 1 tablet by mouth 2 (Two) Times a Day As Needed for Anxiety.   • glipizide (Glucotrol) 5 MG tablet Take 1 tablet by mouth 2 (Two) Times a Day Before Meals.   • glucose blood test strip Test blood sugar once a day   • glucose monitor monitoring kit 1 each Daily. Please dispense Accu-Chek glucometer or may substitute brand per formulary   • hydrOXYzine (ATARAX) 25 MG tablet Take 1 tablet by mouth 3 (Three) Times a Day As Needed for Itching.   • metFORMIN (Glucophage) 1000 MG tablet TAKE 1 PILL PO Q AM  AND 1 1/2 PO QHS   • omeprazole (PrilOSEC) 20 MG capsule Take 1 capsule by mouth Daily.   • valsartan-hydrochlorothiazide (DIOVAN-HCT) 320-25 MG per tablet Take 1 tablet by mouth Daily.     No current facility-administered medications on file prior to visit.       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0   • Smokeless tobacco: Never   Substance and Sexual Activity   • Alcohol use: No   • Drug use: No   • Sexual activity: Not Currently     Birth control/protection: None      Social History     Social History Narrative   • Not on file     Family History   Problem Relation Age of Onset   • Cancer Mother    • Heart disease Mother    • Hypertension Mother    • Thyroid disease Mother    • Diabetes Paternal Grandmother    • Heart disease Maternal Grandmother    • Hypertension Maternal Grandmother    • Diabetes Paternal Grandfather    • Breast cancer Neg Hx        Review of Systems: 14 point review of systems performed, positive pertinent findings identified in HPI. All remaining systems negative     Review of Systems      Physical Exam:   Vitals:    23 1329   Temp: 97.6 °F (36.4 °C)   Weight: 111 kg (244 lb)   Height: 172.7 cm  "(68\")   PainSc:   8     Physical Exam   Constitutional: pleasant, well developed   Eyes: sclera non icteric  Hearing : adequate for exam  Cardiovascular: palpable pulses in right foot, right calf/ thigh NT without sign of DVT  Respiratoy: breathing unlabored   Neurological: grossly sensate to LT throughout right LE  Psychiatric: oriented with normal mood and affect.   Lymphatic: No palpable popliteal lymphadenopathy right LE  Skin: intact throughout right leg/foot  Musculoskeletal: On exam there is moderate swelling with significant tenderness to palpation over the inferomedial aspect of the right hindfoot.  She has significant tenderness along the posterior tib tendon and is very weak with guarding on resisted inversion.  Physical Exam  Ortho Exam    Radiology: 3 views of the right ankle ordered evaluate pain reviewed and no prior x-rays available for comparison.  Talus appears well-seated within the mortise I do not see any obvious osteochondral defects or appreciable arthritis.  There is plantar calcaneal spurring    Assessment/Plan: 1.  Inferomedial hindfoot pain with possible posterior tib tendinitis versus tear.    We need her to try to offload this and we fitted her with a PT TD brace rather than a boot so as not to aggravate her back and tried to instruct her and demonstrated on use of a cane but could not tolerate this on either side due to it bothering her back.    She will use a PT TD brace and try to limit activity on this and I think it is crucial that we get an MRI for further evaluation of this in order to make sure there is not a tear and determine any other pathology and degree of involvement of the posterior tib tendon to help tailor treatment protocol.    We prescribed Pennsaid for to apply the area twice daily and also meloxicam 15 mg p.o. daily with GI precautions and counseled to avoid other anti-inflammatories other than Tylenol such as ibuprofen Aleve Naprosyn ibuprofen etc. while on " this.    We will see her back in 3 weeks to review MRI and assess progress and determine treatment course going forward

## 2023-01-27 ENCOUNTER — HOSPITAL ENCOUNTER (OUTPATIENT)
Dept: MRI IMAGING | Facility: HOSPITAL | Age: 58
Discharge: HOME | End: 2023-01-27
Admitting: ORTHOPAEDIC SURGERY
Payer: COMMERCIAL

## 2023-01-27 DIAGNOSIS — M76.821 POSTERIOR TIBIAL TENDONITIS, RIGHT: ICD-10-CM

## 2023-01-27 PROCEDURE — 73721 MRI JNT OF LWR EXTRE W/O DYE: CPT

## 2023-02-09 ENCOUNTER — OFFICE VISIT (OUTPATIENT)
Dept: ORTHOPEDIC SURGERY | Facility: CLINIC | Age: 58
End: 2023-02-09
Payer: COMMERCIAL

## 2023-02-09 VITALS — HEIGHT: 67 IN | TEMPERATURE: 98.6 F | WEIGHT: 244 LBS | BODY MASS INDEX: 38.3 KG/M2

## 2023-02-09 DIAGNOSIS — R93.7 BONE MARROW EDEMA: ICD-10-CM

## 2023-02-09 DIAGNOSIS — M65.9 TENOSYNOVITIS OF RIGHT ANKLE: Primary | ICD-10-CM

## 2023-02-09 DIAGNOSIS — M66.871 TIBIALIS POSTERIOR TENDON TEAR, NONTRAUMATIC, RIGHT: ICD-10-CM

## 2023-02-09 PROBLEM — M65.971 TENOSYNOVITIS OF RIGHT ANKLE: Status: ACTIVE | Noted: 2023-02-09

## 2023-02-09 PROCEDURE — 99213 OFFICE O/P EST LOW 20 MIN: CPT | Performed by: ORTHOPAEDIC SURGERY

## 2023-02-09 NOTE — PROGRESS NOTES
"Ankle Follow Up      Patient: Skylar Paige    YOB: 1965 57 y.o. female    Chief Complaints: Ankle still hurts    History of Present Illness:Patient was seen on 1/19/2023 reporting onset of pain and swelling over the inferomedial aspect of her right hindfoot about a month prior.  She describes her symptoms as moderate to severe worse with walking.     She did have a history of some back problems but denied any numbness or tingling.    We tried to have her use a week cane to offload this but she was afraid the boot would aggravate her back and had trouble using a cane as it was bothering her back.  She was fitted with a PT TD brace and prescribed Pennsaid as well as meloxicam 15 mg daily with GI precautions and sent for MRI.    She is seen back today stating that the brace was painful after wearing it for an hour and has not used a cane or walker.    She denies any worsening nor any appreciable improvement in symptoms.  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  On exam she has mild planovalgus alignment.  There is mild swelling and tenderness to palpation on the posterior tib tendon was able to actively invert but with some weakness.      Radiology: MRI films and report of the right hindfoot dated 1/27/2023 reviewed which show fluid distention of the posterior tib tendon sheath.  There is increased signal in the substance of the posterior tib tendon with a small partial tear along the posterior margin just proximal to its insertion involving the majority of the tendon with a small interstitial tear extending proximally within the substance of the tendon    There is mild edema in the subcutaneous fat surrounding the posterior tib tendon sheath and reactive marrow edema within the posterior aspect of the medial malleolus.      Assessment/Plan: 1.  Right posterior tib tendinopathy with tenosynovitis and small partial tear of the posterior tib tendon just proximal to the insertion with interstitial extension but no complete tear or retraction.  2.  Right posterior medial bone marrow edema    Reviewed treatment options with her today and would not recommend any surgical treatment for this at this time.    Reviewed with her that the tendon really need to rest and tried emphatically to get her to use a boot with an arch support in it and offload with a walker that she already has.  After thorough long discussion with her she declined using the boot and we tried to have her fitted with an ASO brace to use with a power step orthotic and she did not want to use that brace either.  We talked about a cast as well which she did not want to do    Counseled her that she should at least try to use that and/or at least a power step orthotic and offload with a walker and limit activity on this.  She was given the name of [employer] to get the power step orthotic.  Counseled her that without proper rest and immobilization of the tendon  that symptoms could progress to the point that she has complete tear we could require extensive surgery of which she voiced clear understanding.    We will have her continue with meloxicam and Pennsaid    Also send her to physical therapy to work on some strengthening and possible modalities and referred her to see Dr. Ashford whose name and number I gave to her for possible PRP injection or other nonoperative treatment modalities.    We will see her back in 1 month.   10

## 2023-04-05 ENCOUNTER — TELEPHONE (OUTPATIENT)
Dept: NEUROSURGERY | Facility: CLINIC | Age: 58
End: 2023-04-05

## 2023-04-05 NOTE — TELEPHONE ENCOUNTER
LVM: Told patient Dr. Sibley will be unable to make the appointment on 5/8/23. Rescheduled for 6/14/223 @3:15PM. Told to call us abck if that time does not work for her. [facility] can read/advise.

## 2023-06-14 ENCOUNTER — OFFICE VISIT (OUTPATIENT)
Dept: NEUROSURGERY | Facility: CLINIC | Age: 58
End: 2023-06-14
Payer: COMMERCIAL

## 2023-06-14 VITALS
HEIGHT: 67 IN | BODY MASS INDEX: 38.3 KG/M2 | SYSTOLIC BLOOD PRESSURE: 136 MMHG | WEIGHT: 244 LBS | DIASTOLIC BLOOD PRESSURE: 70 MMHG

## 2023-06-14 DIAGNOSIS — M51.34 DEGENERATION OF THORACIC INTERVERTEBRAL DISC: ICD-10-CM

## 2023-06-14 DIAGNOSIS — G89.29 CHRONIC BILATERAL THORACIC BACK PAIN: Primary | ICD-10-CM

## 2023-06-14 DIAGNOSIS — M54.6 CHRONIC BILATERAL THORACIC BACK PAIN: Primary | ICD-10-CM

## 2023-06-14 PROCEDURE — 99213 OFFICE O/P EST LOW 20 MIN: CPT | Performed by: NEUROLOGICAL SURGERY

## 2023-07-18 ENCOUNTER — TELEPHONE (OUTPATIENT)
Dept: NEUROSURGERY | Facility: CLINIC | Age: 58
End: 2023-07-18

## 2023-07-18 NOTE — TELEPHONE ENCOUNTER
PATIENT CALLED IN AND STATES HER LOWER BACK PAIN IS WORSENING.  STATES SHE IS NOT ABLE TO DRIVE AND HAS BEEN STAYING HOME.  SHE HAS NOT GONE TO PAIN MANAGEMENT APPOINTMENT YET.      PATIENT WANTS TO HAVE AN MRI AND THINKS SHE NEEDS TO DISCUSS SURGERY WITH DR. TANG.      PLEASE CALL PATIENT WITH ANY ADVICE.    THANK YOU!

## 2023-08-01 ENCOUNTER — OFFICE VISIT (OUTPATIENT)
Dept: FAMILY MEDICINE CLINIC | Facility: CLINIC | Age: 58
End: 2023-08-01
Payer: COMMERCIAL

## 2023-08-01 VITALS
BODY MASS INDEX: 37.05 KG/M2 | SYSTOLIC BLOOD PRESSURE: 136 MMHG | OXYGEN SATURATION: 98 % | WEIGHT: 236.6 LBS | HEART RATE: 71 BPM | DIASTOLIC BLOOD PRESSURE: 76 MMHG | TEMPERATURE: 97.3 F

## 2023-08-01 DIAGNOSIS — M51.36 DDD (DEGENERATIVE DISC DISEASE), LUMBAR: ICD-10-CM

## 2023-08-01 DIAGNOSIS — E04.1 THYROID NODULE: ICD-10-CM

## 2023-08-01 DIAGNOSIS — E78.2 MIXED HYPERLIPIDEMIA: ICD-10-CM

## 2023-08-01 DIAGNOSIS — I10 ESSENTIAL HYPERTENSION: Primary | ICD-10-CM

## 2023-08-01 DIAGNOSIS — E11.9 TYPE 2 DIABETES MELLITUS WITHOUT COMPLICATION, WITHOUT LONG-TERM CURRENT USE OF INSULIN: ICD-10-CM

## 2023-08-01 PROCEDURE — 99214 OFFICE O/P EST MOD 30 MIN: CPT | Performed by: NURSE PRACTITIONER

## 2023-08-02 LAB
ALBUMIN SERPL-MCNC: 4.6 G/DL (ref 3.5–5.2)
ALBUMIN/GLOB SERPL: 1.6 G/DL
ALP SERPL-CCNC: 57 U/L (ref 39–117)
ALT SERPL-CCNC: 71 U/L (ref 1–33)
AST SERPL-CCNC: 34 U/L (ref 1–32)
BASOPHILS # BLD AUTO: 0.04 10*3/MM3 (ref 0–0.2)
BASOPHILS NFR BLD AUTO: 0.6 % (ref 0–1.5)
BILIRUB SERPL-MCNC: 0.5 MG/DL (ref 0–1.2)
BUN SERPL-MCNC: 16 MG/DL (ref 6–20)
BUN/CREAT SERPL: 23.9 (ref 7–25)
CALCIUM SERPL-MCNC: 10.4 MG/DL (ref 8.6–10.5)
CHLORIDE SERPL-SCNC: 100 MMOL/L (ref 98–107)
CHOLEST SERPL-MCNC: 191 MG/DL (ref 0–200)
CO2 SERPL-SCNC: 29.5 MMOL/L (ref 22–29)
CREAT SERPL-MCNC: 0.67 MG/DL (ref 0.57–1)
EGFRCR SERPLBLD CKD-EPI 2021: 102.1 ML/MIN/1.73
EOSINOPHIL # BLD AUTO: 0.14 10*3/MM3 (ref 0–0.4)
EOSINOPHIL NFR BLD AUTO: 2.1 % (ref 0.3–6.2)
ERYTHROCYTE [DISTWIDTH] IN BLOOD BY AUTOMATED COUNT: 12.7 % (ref 12.3–15.4)
GLOBULIN SER CALC-MCNC: 2.9 GM/DL
GLUCOSE SERPL-MCNC: 148 MG/DL (ref 65–99)
HBA1C MFR BLD: 8.3 % (ref 4.8–5.6)
HCT VFR BLD AUTO: 44.8 % (ref 34–46.6)
HDLC SERPL-MCNC: 50 MG/DL (ref 40–60)
HGB BLD-MCNC: 15.1 G/DL (ref 12–15.9)
IMM GRANULOCYTES # BLD AUTO: 0.02 10*3/MM3 (ref 0–0.05)
IMM GRANULOCYTES NFR BLD AUTO: 0.3 % (ref 0–0.5)
LDLC SERPL CALC-MCNC: 123 MG/DL (ref 0–100)
LYMPHOCYTES # BLD AUTO: 2.87 10*3/MM3 (ref 0.7–3.1)
LYMPHOCYTES NFR BLD AUTO: 42.6 % (ref 19.6–45.3)
MCH RBC QN AUTO: 30.4 PG (ref 26.6–33)
MCHC RBC AUTO-ENTMCNC: 33.7 G/DL (ref 31.5–35.7)
MCV RBC AUTO: 90.3 FL (ref 79–97)
MONOCYTES # BLD AUTO: 0.53 10*3/MM3 (ref 0.1–0.9)
MONOCYTES NFR BLD AUTO: 7.9 % (ref 5–12)
NEUTROPHILS # BLD AUTO: 3.14 10*3/MM3 (ref 1.7–7)
NEUTROPHILS NFR BLD AUTO: 46.5 % (ref 42.7–76)
NRBC BLD AUTO-RTO: 0.1 /100 WBC (ref 0–0.2)
PLATELET # BLD AUTO: 202 10*3/MM3 (ref 140–450)
POTASSIUM SERPL-SCNC: 4.6 MMOL/L (ref 3.5–5.2)
PROT SERPL-MCNC: 7.5 G/DL (ref 6–8.5)
RBC # BLD AUTO: 4.96 10*6/MM3 (ref 3.77–5.28)
SODIUM SERPL-SCNC: 141 MMOL/L (ref 136–145)
TRIGL SERPL-MCNC: 102 MG/DL (ref 0–150)
TSH SERPL DL<=0.005 MIU/L-ACNC: 2.33 UIU/ML (ref 0.27–4.2)
VLDLC SERPL CALC-MCNC: 18 MG/DL (ref 5–40)
WBC # BLD AUTO: 6.74 10*3/MM3 (ref 3.4–10.8)

## 2023-08-18 DIAGNOSIS — M50.322 DEGENERATION OF INTERVERTEBRAL DISC AT C5-C6 LEVEL: ICD-10-CM

## 2023-08-22 DIAGNOSIS — M50.322 DEGENERATION OF INTERVERTEBRAL DISC AT C5-C6 LEVEL: ICD-10-CM

## 2023-08-26 ENCOUNTER — OFFICE VISIT (OUTPATIENT)
Dept: NEUROSURGERY | Facility: CLINIC | Age: 58
End: 2023-08-26
Payer: COMMERCIAL

## 2023-08-26 VITALS
WEIGHT: 236 LBS | DIASTOLIC BLOOD PRESSURE: 72 MMHG | BODY MASS INDEX: 37.04 KG/M2 | HEIGHT: 67 IN | SYSTOLIC BLOOD PRESSURE: 126 MMHG

## 2023-08-26 DIAGNOSIS — G89.29 CHRONIC BILATERAL THORACIC BACK PAIN: Primary | ICD-10-CM

## 2023-08-26 DIAGNOSIS — M51.36 DDD (DEGENERATIVE DISC DISEASE), LUMBAR: ICD-10-CM

## 2023-08-26 DIAGNOSIS — M54.6 CHRONIC BILATERAL THORACIC BACK PAIN: Primary | ICD-10-CM

## 2023-08-26 DIAGNOSIS — M51.16 HERNIATION OF LUMBAR INTERVERTEBRAL DISC WITH RADICULOPATHY: ICD-10-CM

## 2023-08-26 PROCEDURE — 99214 OFFICE O/P EST MOD 30 MIN: CPT | Performed by: NEUROLOGICAL SURGERY

## 2023-08-26 RX ORDER — METHOCARBAMOL 750 MG/1
750 TABLET, FILM COATED ORAL 3 TIMES DAILY PRN
Qty: 90 TABLET | Refills: 5 | Status: SHIPPED | OUTPATIENT
Start: 2023-08-26

## 2023-08-26 NOTE — PROGRESS NOTES
Subjective   Patient ID: Skylar Paige is a 58 y.o. female is here today for follow-up on MRI & Xray of lumbar.    She is with her .  I saw her a few months ago with respect to her chronic thoracic back pain.  I told her there really was not much I can do for her and sent her to Dr. Escobar.  Apparently her insurance will not pay for an ablation.  But last month in July she was having a lot more low back pain somewhat left-sided.  She does not describe any significant sciatica either the left or the right although she told me in the past she had an episode of right-sided sciatica.  She has no motor deficits.  She says that she was completely incapacitated and spent most of July in bed.  She contacted us and we got a lumbar MRI which did show some left-sided disc extrusion at L3-L4 and some foraminal stenosis at L5-S1 mostly on the right.  Clearly there are some structural reasons for her to have her low back pain but she does not really have any sciatica and I explained to her and her  that surgery is not the appropriate option.  She needs to try some physical therapy and perhaps Dr. Escobar who she still sees can try a lumbar epidural block.  If she develops sciatica that is a different matter and perhaps surgery might be considered in the future but not at this time.  I told her she needs to understand the nature of her problem which I have attempted on multiple occasions to try to explain to her.  There is no miraculous cure and at the current time surgery is not indicated.    History of Present Illness    The following portions of the patient's history were reviewed and updated as appropriate: allergies, current medications, past family history, past medical history, past social history, past surgical history, and problem list.    Review of Systems   Constitutional:  Negative for fever.   Musculoskeletal:  Positive for back pain.   Neurological:  Positive for numbness. Negative for weakness.  "  All other systems reviewed and are negative.        Objective     Vitals:    08/26/23 0930   BP: 126/72   BP Location: Left arm   Patient Position: Sitting   Cuff Size: Adult   Weight: 107 kg (236 lb)   Height: 170.2 cm (67.01\")   PainSc: 0-No pain     Body mass index is 36.95 kg/mý.    Tobacco Use: Medium Risk    Smoking Tobacco Use: Former    Smokeless Tobacco Use: Never    Passive Exposure: Not on file          Physical Exam  Constitutional:       Appearance: She is well-developed.   HENT:      Head: Normocephalic and atraumatic.   Eyes:      Extraocular Movements: EOM normal.      Conjunctiva/sclera: Conjunctivae normal.      Pupils: Pupils are equal, round, and reactive to light.   Neck:      Vascular: No carotid bruit.   Neurological:      Mental Status: She is oriented to person, place, and time.      Coordination: Finger-Nose-Finger Test and Heel to Shin Test normal.      Gait: Gait is intact.      Deep Tendon Reflexes:      Reflex Scores:       Tricep reflexes are 2+ on the right side and 2+ on the left side.       Bicep reflexes are 2+ on the right side and 2+ on the left side.       Brachioradialis reflexes are 2+ on the right side and 2+ on the left side.       Patellar reflexes are 2+ on the right side and 2+ on the left side.       Achilles reflexes are 2+ on the right side and 2+ on the left side.  Psychiatric:         Speech: Speech normal.     Neurologic Exam     Mental Status   Oriented to person, place, and time.   Registration of memory: Good recent and remote memory.   Attention: normal. Concentration: normal.   Speech: speech is normal   Level of consciousness: alert  Knowledge: consistent with education.     Cranial Nerves     CN II   Visual fields full to confrontation.   Visual acuity: normal    CN III, IV, VI   Pupils are equal, round, and reactive to light.  Extraocular motions are normal.     CN V   Facial sensation intact.   Right corneal reflex: normal  Left corneal reflex: " normal    CN VII   Facial expression full, symmetric.   Right facial weakness: none  Left facial weakness: none    CN VIII   Hearing: intact    CN IX, X   Palate: symmetric    CN XI   Right sternocleidomastoid strength: normal  Left sternocleidomastoid strength: normal    CN XII   Tongue: not atrophic  Tongue deviation: none    Motor Exam   Muscle bulk: normal  Right arm tone: normal  Left arm tone: normal  Right leg tone: normal  Left leg tone: normal    Strength   Strength 5/5 except as noted.     Sensory Exam   Light touch normal.     Gait, Coordination, and Reflexes     Gait  Gait: normal    Coordination   Finger to nose coordination: normal  Heel to shin coordination: normal    Reflexes   Right brachioradialis: 2+  Left brachioradialis: 2+  Right biceps: 2+  Left biceps: 2+  Right triceps: 2+  Left triceps: 2+  Right patellar: 2+  Left patellar: 2+  Right achilles: 2+  Left achilles: 2+  Right : 2+  Left : 2+        Assessment & Plan   Independent Review of Radiographic Studies:      I personally reviewed the images from the following studies.    The lumbar MRI done on 8/19/2023 does show a left L3-L4 disc extrusion and some foraminal stenosis at L5-S1 bilaterally, worse on the right.  Agree with the report.    Medical Decision Making:      Nothing operative for both the thoracic and the lumbar spine although if she develops sciatica in the future that assessment might change.  We will send her to physical therapy for the lumbar region.  I gave her some muscle relaxants.  I encouraged her to continue working with Dr. Escobar and perhaps get an epidural block in the lumbar region.  We will see her in 3 months.        Diagnoses and all orders for this visit:    1. Chronic bilateral thoracic back pain (Primary)    2. DDD (degenerative disc disease), lumbar  -     Ambulatory Referral to Physical Therapy Evaluate and treat    3. Herniation of lumbar intervertebral disc with radiculopathy  -     Ambulatory  Referral to Physical Therapy Evaluate and treat    Other orders  -     methocarbamol (ROBAXIN) 750 MG tablet; Take 1 tablet by mouth 3 (Three) Times a Day As Needed for Muscle Spasms.  Dispense: 90 tablet; Refill: 5      Return in about 3 months (around 11/26/2023) for Face-to-face.

## 2023-10-11 DIAGNOSIS — I10 ESSENTIAL HYPERTENSION: ICD-10-CM

## 2023-10-11 RX ORDER — VALSARTAN AND HYDROCHLOROTHIAZIDE 320; 25 MG/1; MG/1
1 TABLET, FILM COATED ORAL DAILY
Qty: 30 TABLET | Refills: 5 | Status: SHIPPED | OUTPATIENT
Start: 2023-10-11

## 2023-10-26 DIAGNOSIS — E78.5 HYPERLIPIDEMIA, UNSPECIFIED HYPERLIPIDEMIA TYPE: ICD-10-CM

## 2023-10-26 RX ORDER — ATORVASTATIN CALCIUM 20 MG/1
20 TABLET, FILM COATED ORAL DAILY
Qty: 30 TABLET | Refills: 11 | Status: SHIPPED | OUTPATIENT
Start: 2023-10-26

## 2023-10-26 NOTE — TELEPHONE ENCOUNTER
"Caller: Skylar Paige \"Skylar Paige\"    Relationship: Self    Requested Prescriptions:   Requested Prescriptions     Pending Prescriptions Disp Refills    atorvastatin (LIPITOR) 20 MG tablet 30 tablet 11     Sig: Take 1 tablet by mouth Daily.        Pharmacy where request should be sent: [facility] [id number] [facility] [address] AT [facility] [address] [facility] - [phone]  - [phone] FX     Last office visit with prescribing clinician: 8/1/2023   Last telemedicine visit with prescribing clinician: Visit date not found   Next office visit with prescribing clinician: Visit date not found     Additional details provided by patient: PATIENT SUBMITTED A REQUEST FOR THIS REFILL ON 10/11, BUT STATES IT WAS NEVER SENT TO HER PHARMACY.   SHE HAS 5 DAYS LEFT.      Does the patient have less than a 3 day supply:  [] Yes  [x] No    Would you like a call back once the refill request has been completed: [] Yes [x] No    If the office needs to give you a call back, can they leave a voicemail: [] Yes [x] No    Сергей Roa Rep   10/26/23 08:26 EDT       "

## 2023-12-19 DIAGNOSIS — E11.9 TYPE 2 DIABETES MELLITUS WITHOUT COMPLICATION, WITHOUT LONG-TERM CURRENT USE OF INSULIN: ICD-10-CM

## 2023-12-19 RX ORDER — GLIPIZIDE 5 MG/1
5 TABLET ORAL
Qty: 60 TABLET | Refills: 11 | Status: SHIPPED | OUTPATIENT
Start: 2023-12-19

## 2023-12-19 NOTE — TELEPHONE ENCOUNTER
"  Caller: Skylar Paige \"Skylar Paige\"    Relationship: Self    Best call back number: [phone]    Requested Prescriptions:   Requested Prescriptions     Pending Prescriptions Disp Refills    glipizide (Glucotrol) 5 MG tablet 60 tablet 11     Sig: Take 1 tablet by mouth 2 (Two) Times a Day Before Meals.        Pharmacy where request should be sent: [facility] [id number] [place] [id number] AT [facility] [address] [facility] [phone] [address] [phone] FX     Last office visit with prescribing clinician: 8/1/2023   Last telemedicine visit with prescribing clinician: Visit date not found   Next office visit with prescribing clinician: Visit date not found     Additional details provided by patient: NO    Does the patient have less than a 3 day supply:  [x] Yes  [] No    Would you like a call back once the refill request has been completed: [x] Yes [] No    If the office needs to give you a call back, can they leave a voicemail: [x] Yes [] No    Сергей Timmons Rep   12/19/23 11:52 EST  "

## 2024-01-24 ENCOUNTER — TELEPHONE (OUTPATIENT)
Dept: NEUROSURGERY | Facility: CLINIC | Age: 59
End: 2024-01-24

## 2024-01-24 NOTE — TELEPHONE ENCOUNTER
Pt wants to have appt rescheduled with Dr Sibley -prefers [facility] noon or 1pm    Thanks  [phone]

## 2024-02-07 ENCOUNTER — TRANSCRIBE ORDERS (OUTPATIENT)
Dept: FAMILY MEDICINE CLINIC | Facility: CLINIC | Age: 59
End: 2024-02-07
Payer: COMMERCIAL

## 2024-02-07 DIAGNOSIS — Z12.31 ENCOUNTER FOR SCREENING MAMMOGRAM FOR BREAST CANCER: Primary | ICD-10-CM

## 2024-02-17 ENCOUNTER — HOSPITAL ENCOUNTER (OUTPATIENT)
Dept: MAMMOGRAPHY | Facility: HOSPITAL | Age: 59
Discharge: HOME | End: 2024-02-17
Admitting: NURSE PRACTITIONER
Payer: COMMERCIAL

## 2024-02-17 DIAGNOSIS — Z12.31 ENCOUNTER FOR SCREENING MAMMOGRAM FOR BREAST CANCER: ICD-10-CM

## 2024-02-17 PROCEDURE — 77067 SCR MAMMO BI INCL CAD: CPT

## 2024-02-17 PROCEDURE — 77063 BREAST TOMOSYNTHESIS BI: CPT

## 2024-03-29 NOTE — PROGRESS NOTES
Subjective   Patient ID: Skylar Paige is a 58 y.o. female is here today for follow-up after physical therapy    This very nice lady has had chronic thoracic and lumbar pain for years.  There really has not been any reason to consider surgical treatment.  Most of her pain I suspect is from a combination of discogenic causes and facet disease.  She does have a left L3-4 disc extrusion but really does not have any persistent radicular pain.  She has been through ablations and epidural blocks.  She has been through 3 different pain practices.  She is no longer seeing Dr. Escobar who felt that she had done all that she could for her and was released.  She does not take any pain medications.  She does exercises which seem to help but she says still that her thoracic and her low back pain bother her on a daily basis.  We reached an impasse in terms of the things that I can offer her.  She certainly does not need any surgery.  I told her that she can try acupuncture which she already has.  I told her she can try massage but by all means she needs to continue with her exercise program which is helping her to compensate.  Will keep it open-ended.  She does have a disc extrusion so if she develops severe radicular pain particularly on the left, she can come back to see me.        History of Present Illness    The following portions of the patient's history were reviewed and updated as appropriate: allergies, current medications, past family history, past medical history, past social history, past surgical history, and problem list.    Review of Systems   Constitutional:  Negative for fever.   Musculoskeletal:  Positive for back pain, gait problem and neck pain.   Neurological:  Positive for weakness and numbness.   All other systems reviewed and are negative.          Objective     Vitals:    04/03/24 1311   BP: 146/80   BP Location: Left arm   Patient Position: Sitting   Cuff Size: Adult   Resp: 20   Weight: 107 kg (236  "lb)   Height: 170.2 cm (67.01\")   PainSc:   5   PainLoc: Back     Body mass index is 36.95 kg/m².    Tobacco Use: Medium Risk (4/3/2024)    Patient History     Smoking Tobacco Use: Former     Smokeless Tobacco Use: Never     Passive Exposure: Not on file          Physical Exam  Constitutional:       Appearance: She is well-developed.   HENT:      Head: Normocephalic and atraumatic.   Eyes:      Extraocular Movements: EOM normal.      Conjunctiva/sclera: Conjunctivae normal.      Pupils: Pupils are equal, round, and reactive to light.   Neck:      Vascular: No carotid bruit.   Neurological:      Mental Status: She is oriented to person, place, and time.      Coordination: Finger-Nose-Finger Test and Heel to Shin Test normal.      Gait: Gait is intact.      Deep Tendon Reflexes:      Reflex Scores:       Tricep reflexes are 2+ on the right side and 2+ on the left side.       Bicep reflexes are 2+ on the right side and 2+ on the left side.       Brachioradialis reflexes are 2+ on the right side and 2+ on the left side.       Patellar reflexes are 2+ on the right side and 2+ on the left side.       Achilles reflexes are 2+ on the right side and 2+ on the left side.  Psychiatric:         Speech: Speech normal.       Neurologic Exam     Mental Status   Oriented to person, place, and time.   Registration of memory: Good recent and remote memory.   Attention: normal. Concentration: normal.   Speech: speech is normal   Level of consciousness: alert  Knowledge: consistent with education.     Cranial Nerves     CN II   Visual fields full to confrontation.   Visual acuity: normal    CN III, IV, VI   Pupils are equal, round, and reactive to light.  Extraocular motions are normal.     CN V   Facial sensation intact.   Right corneal reflex: normal  Left corneal reflex: normal    CN VII   Facial expression full, symmetric.   Right facial weakness: none  Left facial weakness: none    CN VIII   Hearing: intact    CN IX, X   Palate: " symmetric    CN XI   Right sternocleidomastoid strength: normal  Left sternocleidomastoid strength: normal    CN XII   Tongue: not atrophic  Tongue deviation: none    Motor Exam   Muscle bulk: normal  Right arm tone: normal  Left arm tone: normal  Right leg tone: normal  Left leg tone: normal    Strength   Strength 5/5 except as noted.     Sensory Exam   Light touch normal.     Gait, Coordination, and Reflexes     Gait  Gait: normal    Coordination   Finger to nose coordination: normal  Heel to shin coordination: normal    Reflexes   Right brachioradialis: 2+  Left brachioradialis: 2+  Right biceps: 2+  Left biceps: 2+  Right triceps: 2+  Left triceps: 2+  Right patellar: 2+  Left patellar: 2+  Right achilles: 2+  Left achilles: 2+  Right : 2+  Left : 2+          Assessment & Plan   Independent Review of Radiographic Studies:      I personally reviewed the images from the following studies.    The lumbar MRI done on 8/19/2023 does show a left L3-L4 disc extrusion and some foraminal stenosis at L5-S1 bilaterally, worse on the right. Agree with the report.     Medical Decision Making:      A bit open-ended.  Unfortunately I really do not have much to offer at this point.  And she has been through 3 different pain management practices.  I told her to continue her exercises and she can try massage therapy.  Will keep it open-ended.  If she develops severe sciatic pain in the future, particularly on the left, she can come back to see me.        Diagnoses and all orders for this visit:    1. Chronic bilateral thoracic back pain (Primary)    2. DDD (degenerative disc disease), lumbar    3. Herniated lumbar intervertebral disc      Return if symptoms worsen or fail to improve.

## 2024-04-03 ENCOUNTER — OFFICE VISIT (OUTPATIENT)
Dept: NEUROSURGERY | Facility: CLINIC | Age: 59
End: 2024-04-03
Payer: COMMERCIAL

## 2024-04-03 VITALS
HEIGHT: 67 IN | DIASTOLIC BLOOD PRESSURE: 80 MMHG | WEIGHT: 236 LBS | SYSTOLIC BLOOD PRESSURE: 146 MMHG | BODY MASS INDEX: 37.04 KG/M2 | RESPIRATION RATE: 20 BRPM

## 2024-04-03 DIAGNOSIS — M51.36 DDD (DEGENERATIVE DISC DISEASE), LUMBAR: ICD-10-CM

## 2024-04-03 DIAGNOSIS — M51.26 HERNIATED LUMBAR INTERVERTEBRAL DISC: ICD-10-CM

## 2024-04-03 DIAGNOSIS — M54.6 CHRONIC BILATERAL THORACIC BACK PAIN: Primary | ICD-10-CM

## 2024-04-03 DIAGNOSIS — G89.29 CHRONIC BILATERAL THORACIC BACK PAIN: Primary | ICD-10-CM

## 2024-04-03 PROCEDURE — 99214 OFFICE O/P EST MOD 30 MIN: CPT | Performed by: NEUROLOGICAL SURGERY

## 2024-09-17 ENCOUNTER — OFFICE VISIT (OUTPATIENT)
Dept: FAMILY MEDICINE CLINIC | Facility: CLINIC | Age: 59
End: 2024-09-17
Payer: COMMERCIAL

## 2024-09-17 VITALS
SYSTOLIC BLOOD PRESSURE: 122 MMHG | BODY MASS INDEX: 37.17 KG/M2 | HEART RATE: 68 BPM | TEMPERATURE: 97.9 F | OXYGEN SATURATION: 97 % | WEIGHT: 237.4 LBS | DIASTOLIC BLOOD PRESSURE: 80 MMHG

## 2024-09-17 DIAGNOSIS — I10 ESSENTIAL HYPERTENSION: ICD-10-CM

## 2024-09-17 DIAGNOSIS — E04.1 THYROID NODULE: ICD-10-CM

## 2024-09-17 DIAGNOSIS — H61.23 BILATERAL IMPACTED CERUMEN: ICD-10-CM

## 2024-09-17 DIAGNOSIS — E11.9 TYPE 2 DIABETES MELLITUS WITHOUT COMPLICATION, WITHOUT LONG-TERM CURRENT USE OF INSULIN: Primary | ICD-10-CM

## 2024-09-17 DIAGNOSIS — Z12.11 SCREENING FOR COLON CANCER: ICD-10-CM

## 2024-09-17 PROCEDURE — 69209 REMOVE IMPACTED EAR WAX UNI: CPT | Performed by: NURSE PRACTITIONER

## 2024-09-17 PROCEDURE — 99396 PREV VISIT EST AGE 40-64: CPT | Performed by: NURSE PRACTITIONER

## 2024-09-17 PROCEDURE — 99214 OFFICE O/P EST MOD 30 MIN: CPT | Performed by: NURSE PRACTITIONER

## 2024-09-17 RX ORDER — CLONIDINE HYDROCHLORIDE 0.1 MG/1
0.1 TABLET ORAL DAILY
Qty: 30 TABLET | Refills: 11 | Status: SHIPPED | OUTPATIENT
Start: 2024-09-17

## 2024-09-17 RX ORDER — AMLODIPINE BESYLATE 5 MG/1
5 TABLET ORAL 2 TIMES DAILY
Qty: 60 TABLET | Refills: 11 | Status: SHIPPED | OUTPATIENT
Start: 2024-09-17

## 2024-09-17 RX ORDER — VALSARTAN AND HYDROCHLOROTHIAZIDE 320; 25 MG/1; MG/1
1 TABLET, FILM COATED ORAL DAILY
Qty: 30 TABLET | Refills: 11 | Status: SHIPPED | OUTPATIENT
Start: 2024-09-17

## 2024-09-17 RX ORDER — GLIPIZIDE 5 MG/1
5 TABLET ORAL
Qty: 60 TABLET | Refills: 11 | Status: SHIPPED | OUTPATIENT
Start: 2024-09-17

## 2024-09-18 LAB
ALBUMIN SERPL-MCNC: 4.6 G/DL (ref 3.8–4.9)
ALBUMIN/CREAT UR: 4 MG/G CREAT (ref 0–29)
ALP SERPL-CCNC: 62 IU/L (ref 44–121)
ALT SERPL-CCNC: 52 IU/L (ref 0–32)
AST SERPL-CCNC: 32 IU/L (ref 0–40)
BASOPHILS # BLD AUTO: 0 X10E3/UL (ref 0–0.2)
BASOPHILS NFR BLD AUTO: 1 %
BILIRUB SERPL-MCNC: 0.3 MG/DL (ref 0–1.2)
BUN SERPL-MCNC: 16 MG/DL (ref 6–24)
BUN/CREAT SERPL: 22 (ref 9–23)
CALCIUM SERPL-MCNC: 9.8 MG/DL (ref 8.7–10.2)
CHLORIDE SERPL-SCNC: 100 MMOL/L (ref 96–106)
CHOLEST SERPL-MCNC: 194 MG/DL (ref 100–199)
CO2 SERPL-SCNC: 23 MMOL/L (ref 20–29)
CREAT SERPL-MCNC: 0.74 MG/DL (ref 0.57–1)
CREAT UR-MCNC: 155.9 MG/DL
EGFRCR SERPLBLD CKD-EPI 2021: 94 ML/MIN/1.73
EOSINOPHIL # BLD AUTO: 0.1 X10E3/UL (ref 0–0.4)
EOSINOPHIL NFR BLD AUTO: 2 %
ERYTHROCYTE [DISTWIDTH] IN BLOOD BY AUTOMATED COUNT: 12.6 % (ref 11.7–15.4)
GLOBULIN SER CALC-MCNC: 3.1 G/DL (ref 1.5–4.5)
GLUCOSE SERPL-MCNC: 183 MG/DL (ref 70–99)
HBA1C MFR BLD: 8.2 % (ref 4.8–5.6)
HCT VFR BLD AUTO: 44.7 % (ref 34–46.6)
HDLC SERPL-MCNC: 37 MG/DL
HGB BLD-MCNC: 14.6 G/DL (ref 11.1–15.9)
IMM GRANULOCYTES # BLD AUTO: 0 X10E3/UL (ref 0–0.1)
IMM GRANULOCYTES NFR BLD AUTO: 0 %
LDLC SERPL CALC-MCNC: 132 MG/DL (ref 0–99)
LYMPHOCYTES # BLD AUTO: 2.1 X10E3/UL (ref 0.7–3.1)
LYMPHOCYTES NFR BLD AUTO: 39 %
MCH RBC QN AUTO: 30 PG (ref 26.6–33)
MCHC RBC AUTO-ENTMCNC: 32.7 G/DL (ref 31.5–35.7)
MCV RBC AUTO: 92 FL (ref 79–97)
MICROALBUMIN UR-MCNC: 6.9 UG/ML
MONOCYTES # BLD AUTO: 0.4 X10E3/UL (ref 0.1–0.9)
MONOCYTES NFR BLD AUTO: 8 %
NEUTROPHILS # BLD AUTO: 2.7 X10E3/UL (ref 1.4–7)
NEUTROPHILS NFR BLD AUTO: 50 %
PLATELET # BLD AUTO: 205 X10E3/UL (ref 150–450)
POTASSIUM SERPL-SCNC: 4.1 MMOL/L (ref 3.5–5.2)
PROT SERPL-MCNC: 7.7 G/DL (ref 6–8.5)
RBC # BLD AUTO: 4.87 X10E6/UL (ref 3.77–5.28)
SODIUM SERPL-SCNC: 138 MMOL/L (ref 134–144)
TRIGL SERPL-MCNC: 141 MG/DL (ref 0–149)
TSH SERPL DL<=0.005 MIU/L-ACNC: 1.98 UIU/ML (ref 0.45–4.5)
VLDLC SERPL CALC-MCNC: 25 MG/DL (ref 5–40)
WBC # BLD AUTO: 5.4 X10E3/UL (ref 3.4–10.8)

## 2024-09-23 DIAGNOSIS — E04.1 THYROID NODULE: Primary | ICD-10-CM

## 2024-09-23 DIAGNOSIS — E04.1 THYROID NODULE: ICD-10-CM

## 2024-11-24 DIAGNOSIS — E78.5 HYPERLIPIDEMIA, UNSPECIFIED HYPERLIPIDEMIA TYPE: ICD-10-CM

## 2024-11-25 RX ORDER — ATORVASTATIN CALCIUM 20 MG/1
20 TABLET, FILM COATED ORAL DAILY
Qty: 90 TABLET | Refills: 2 | Status: SHIPPED | OUTPATIENT
Start: 2024-11-25

## 2024-11-25 NOTE — TELEPHONE ENCOUNTER
LOV                   9/17/2024  NOV                   3/18/2025  Last refill           10/26/23    Protocol              met

## 2025-02-04 NOTE — PROGRESS NOTES
The following are suggestions to help you with upper respiratory symptoms.    Body Aches/Pains/Fever  For patients who are not allergic to and are not on anticoagulants, you can alternate Tylenol every 4 hours and Motrin every 6 hours for fever above 100.4F and/or pain.  For patients who are allergic or intolerant to NSAIDS, have gastritis, gastric ulcers, or history of GI bleeds, are pregnant, or are on anticoagulant therapy, you can take Tylenol every 4 hours as needed for fever above 100.4F and/or pain.    Congestion:    Nasal Saline   Nasal saline is available over the counter. There are several different commercial preparations such as Ocean spray and Ayr spray. There is no limit on the use of Nasal saline. Saline is used by snorting the mist up into the nose then later gently blowing the nose to get rid of any secretions that it has loosened.    Nasal irrigation   Flushing the nose and sinuses with a saline solution several times per day can decrease pain associated with congestion and shorten the duration of symptoms.     Decongestant Nasal Sprays  Over-the-counter decongestant nasal spray such as Afrin, may be helpful as an initial step in treating upper respiratory infections. This spray can be used for up to approximately 3 days and is used no more than twice per day. Topical nasal decongestant spray for longer than 5 days will result in a physical addiction, in which the nasal lining will become significantly swollen and irritated until the spray is used again. They May also cause elevated blood pressure.    Nasal Steroids  Nasal steroids, such as Flonase, can be beneficial and help reduce swelling inside the nose. These drugs have few side effects and relieve symptoms in most people. Unfortunately, they do not begin to work for 2-3 days and do not reach their maximum benefit for approximately 2-3 weeks.   There are several nasal steroids available by prescription as well as a few that can be  "Subjective   Patient ID: Skylar Paige is a 57 y.o. female is here today for follow-up FOLLOW UP EXT - CHRONIC MIDLINE THORACIC BACK PAIN, THORACIC DISC HERNIATION. MRI TSPINE 6/21/22 @ [facility].    I saw this patient about 3 years ago with the same problem.  That being chronic thoracic back pain. She has a lot of thoracic facet disease and a recent MRI showed pretty much the same finding as in 2020 when I last saw her.  Longer working.  For some reason, she never got a call from the [facility] pain management service and never saw Dr. Mcintosh.  She went to the pain [facility] last year and got trigger point injections which did not help.  I told her again that nothing surgical was needed.  This time we will send her to Dr. Escobar for the potential for RFA of the thoracic spine.        History of Present Illness    The following portions of the patient's history were reviewed and updated as appropriate: allergies, current medications, past family history, past medical history, past social history, past surgical history and problem list.    Review of Systems   Constitutional: Negative for fever.   Musculoskeletal: Positive for back pain.   Neurological: Negative for weakness and numbness.   All other systems reviewed and are negative.          Objective     Vitals:    06/14/23 1545   BP: 136/70   BP Location: Left arm   Patient Position: Sitting   Cuff Size: Adult   Weight: 111 kg (244 lb)   Height: 170.2 cm (67.01\")   PainSc:   7   PainLoc: Back     Body mass index is 38.21 kg/m².    Tobacco Use: Medium Risk   • Smoking Tobacco Use: Former   • Smokeless Tobacco Use: Never   • Passive Exposure: Not on file          Physical Exam  Constitutional:       Appearance: She is well-developed.   HENT:      Head: Normocephalic and atraumatic.   Eyes:      Extraocular Movements: EOM normal.      Conjunctiva/sclera: Conjunctivae normal.      Pupils: Pupils are equal, round, and reactive to light.   Neck:      Vascular: " No carotid bruit.   Neurological:      Mental Status: She is oriented to person, place, and time.      Coordination: Finger-Nose-Finger Test and Heel to Shin Test normal.      Gait: Gait is intact.      Deep Tendon Reflexes:      Reflex Scores:       Tricep reflexes are 2+ on the right side and 2+ on the left side.       Bicep reflexes are 2+ on the right side and 2+ on the left side.       Brachioradialis reflexes are 2+ on the right side and 2+ on the left side.       Patellar reflexes are 2+ on the right side and 2+ on the left side.       Achilles reflexes are 2+ on the right side and 2+ on the left side.  Psychiatric:         Speech: Speech normal.       Neurologic Exam     Mental Status   Oriented to person, place, and time.   Registration of memory: Good recent and remote memory.   Attention: normal. Concentration: normal.   Speech: speech is normal   Level of consciousness: alert  Knowledge: consistent with education.     Cranial Nerves     CN II   Visual fields full to confrontation.   Visual acuity: normal    CN III, IV, VI   Pupils are equal, round, and reactive to light.  Extraocular motions are normal.     CN V   Facial sensation intact.   Right corneal reflex: normal  Left corneal reflex: normal    CN VII   Facial expression full, symmetric.   Right facial weakness: none  Left facial weakness: none    CN VIII   Hearing: intact    CN IX, X   Palate: symmetric    CN XI   Right sternocleidomastoid strength: normal  Left sternocleidomastoid strength: normal    CN XII   Tongue: not atrophic  Tongue deviation: none    Motor Exam   Muscle bulk: normal  Right arm tone: normal  Left arm tone: normal  Right leg tone: normal  Left leg tone: normal    Strength   Strength 5/5 except as noted.     Sensory Exam   Light touch normal.     Gait, Coordination, and Reflexes     Gait  Gait: normal    Coordination   Finger to nose coordination: normal  Heel to shin coordination: normal    Reflexes   Right brachioradialis:  purchased without a prescription (over the counter). These drugs are all effective but differ in how frequently they must be used and how much they cost.    Nasal anticholinergics  Ipratropium bromide (nasal spray) is available by prescription and can be very effective in decreasing the symptom of runny nose and other related symptoms (eg, post-nasal drainage, sore throat). These sprays, like all medications, can interact with other medications, so it is important that your complete medication list be reviewed by your physician before you take this medication.    If you develop a bloody nose, stop using the medication immediately.    Oral Decongestant  Use pseudoephedrine (behind the counter) for sinus pressure and congestion- Pseudoephedrine 30 mg up to 240 mg /day. Common brands include Sudafed, Zephrex-D Wal-phed.  Warning: It can raise your blood pressure and give you palpitations, avoid with history of high blood pressure, palpitations, and severe cardiac disease.  Coricidin HBP is okay to use if you have high blood pressure.     Mucous Thinners/Decongestant Combination   Mucous thinners and decongestants are used to shrink down the tissues and promote sinus drainage. There are multiple prescription and over-the-counter medications available. A mucous thinner will tend to be drying unless you are also drinking plenty of water when taking these. If you have high blood pressure, it is very important to monitor your pressure while on decongestants. The mucous thinner/decongestant combinations are typically given twice per day. However, some people will be unable to tolerate these at night and should only take them once per day.    Clear Runny Nose/Allergic Rhinitis:  Use an antihistamine to help dry you out or nasal anticholinergics as mentioned above.     Antihistamines  Antihistamines are available both over the counter and as a prescription. There are also various decongestant and antihistamine combinations  2+  Left brachioradialis: 2+  Right biceps: 2+  Left biceps: 2+  Right triceps: 2+  Left triceps: 2+  Right patellar: 2+  Left patellar: 2+  Right achilles: 2+  Left achilles: 2+  Right : 2+  Left : 2+          Assessment & Plan   Independent Review of Radiographic Studies:      I personally reviewed the images from the following studies.    I reviewed the repeat thoracic MRI done in which shows pretty much the same as.  There is some disc protrusions at multiple levels and some facet disease but can cord or root compression.  Agree with the report.    Medical Decision Making:      Again, nothing surgical.  This time, we will send her to Dr. Escobar for a possible radiofrequency ablation.  Follow-up is on an as-needed basis.  I really have nothing    Diagnoses and all orders for this visit:    1. Chronic bilateral thoracic back pain (Primary)  -     Ambulatory Referral to Pain Management    2. Degeneration of thoracic intervertebral disc  -     Ambulatory Referral to Pain Management      Return if symptoms worsen or fail to improve.          available such as Claritin, Allegra, and Zyrtec. It is best to take any antihistamine-decongestant combination in the morning to avoid insomnia. Zyrtec should probably be taken at night, to reduce the chance of sleepiness during the daytime. If there is a significant infection present and secretions are already thickened, it is recommended to discontinue antihistamines and use a mucous thinner/decongestant combination.    Oral Steroids  Oral steroids can be used with more severe infections. Often, they are the only medications that will reduce the symptoms of pressure and allow the nasal sinuses to drain. These are best taken on a full stomach and earlier in the day is better. They may give you some irritability, stomach upset, or hyperactivity. This can also interfere with sleep.     A person who has high blood pressure or diabetes should be very careful and monitor their blood pressure or blood glucose while taking steroids. Steroids can have multiple side effects especially when taken long-term. Short-term doses are usually very well tolerated and effective in controlling the symptoms associated with sinus infections and severe allergies. The use of steroids for greater than approximately seven days requires a tapering down to discontinue them. You should not abruptly stop your steroid if you have been taking the same dose for greater than 7 days.    Body Aches/Pains/Fever  For patients who are not allergic to and are not on anticoagulants, you can alternate Tylenol every 4 hours and Motrin every 6 hours for fever above 100.4F and/or pain.  For patients who are allergic or intolerant to NSAIDS, have gastritis, gastric ulcers, or history of GI bleeds, are pregnant, or are on anticoagulant therapy, you can take Tylenol every 4 hours as needed for fever above 100.4F and/or pain.     Maintain adequate hydration - Rest and keep yourself/patient well hydrated. For adults, it is recommended to drink at least 8 glasses  of water daily.  This may help thin secretions and soothe the respiratory mucosa.     Sore Throat  Perform warm, saltwater gargles (1/2 tsp salt to 1 cup warm water) to help reduce inflammation and throat discomfort. Chloraseptic sprays and throat lozenges will also help with your throat pain.    COUGH  A viral cough may linger for 3 to 4 weeks but should steadily improve over time. Coughing is the body's natural way to clear mucus and help get rid of bacteria and viruses. Therefore, cough suppressants are usually not recommended.      Use Mucinex (guaifenesin) up to 2400mg/day to help clear and break up/loosen mucus/congestion from the chest when you have a cold or flu.      Common cough suppressants include the ingredient dextromethorphan or DM, (such as Mucinex DM) available over the counter and can be used for cough to stop the tickle in the back of your throat.      ? Honey may be beneficial, especially on nocturnal cough 1 to 2 teaspoons can be taken straight or diluted in tea, juice or other liquid.    The antioxidants in honey are an important contributor to its decongestant properties. Darker honey contains more antioxidants. Buckwheat and avocado honey are particularly good choices. If these honeys are not available in your area, choose the darkest honey you can find.    It is important to follow up for Re-evaluation if new or worsening symptoms develop or symptoms exceed the expected duration of common cold.     Worsening or persistent symptoms may indicate the development of complications or the need to consider a diagnosis other than the common cold requiring an antibiotic. (eg, acute bacterial sinusitis, pneumonia, pertussis).    Go to Emergency Department or call 911 if you develop new or worsening symptoms including but not limited to:  Trouble breathing.  New or worsening chest discomfort.  Feel confused or disoriented.  Vomiting and can't keep liquids down.  Develop signs of fluid loss, such as  dark-colored urine and muscle cramps.    **You must understand that you have received Urgent Care treatment only and that you may be released before all your medical problems are known or treated. You, the patient, are responsible to arrange for follow-up care as instructed.

## 2025-03-18 ENCOUNTER — OFFICE VISIT (OUTPATIENT)
Dept: FAMILY MEDICINE CLINIC | Facility: CLINIC | Age: 60
End: 2025-03-18
Payer: COMMERCIAL

## 2025-03-18 VITALS
DIASTOLIC BLOOD PRESSURE: 70 MMHG | OXYGEN SATURATION: 99 % | HEIGHT: 67 IN | WEIGHT: 241.8 LBS | HEART RATE: 75 BPM | BODY MASS INDEX: 37.95 KG/M2 | TEMPERATURE: 97.1 F | SYSTOLIC BLOOD PRESSURE: 130 MMHG

## 2025-03-18 DIAGNOSIS — E11.9 TYPE 2 DIABETES MELLITUS WITHOUT COMPLICATION, WITHOUT LONG-TERM CURRENT USE OF INSULIN: ICD-10-CM

## 2025-03-18 DIAGNOSIS — I10 ESSENTIAL HYPERTENSION: Primary | ICD-10-CM

## 2025-03-18 DIAGNOSIS — Z12.11 SCREENING FOR COLON CANCER: ICD-10-CM

## 2025-03-18 DIAGNOSIS — K21.9 GASTROESOPHAGEAL REFLUX DISEASE, UNSPECIFIED WHETHER ESOPHAGITIS PRESENT: ICD-10-CM

## 2025-03-18 DIAGNOSIS — E78.5 HYPERLIPIDEMIA, UNSPECIFIED HYPERLIPIDEMIA TYPE: ICD-10-CM

## 2025-03-18 DIAGNOSIS — Z12.31 ENCOUNTER FOR SCREENING MAMMOGRAM FOR MALIGNANT NEOPLASM OF BREAST: ICD-10-CM

## 2025-03-18 NOTE — PROGRESS NOTES
"Chief Complaint  Diabetes (Wants blood work- patient is fasing)    Subjective        Skylar Paige presents to [facility]  History of Present Illness  Patient is here to follow up for diabetes. She has not been taking the januvia due to cost, her out of pocket would have been 150. She cannot afford. She does have some mild foot numbness. She has not recently been to eye doctor, but has a pending appointment. She does not normally check blood sugars daily, does it occasionally. She is taking her metformin and glipizide.  She does have some occasions where her blood sugar feels low and usually when she checks it will be anywhere form  but she's normally higher than that. Patient is requesting multiple lab tests, including thyroid, kidney and liver tests.     Patient declines vaccinations.    Patient is concerned she will need endoscopy as she has occasional heart burn and other stomach concerns, including intermittent left sided abdominal pain. She is amenable to colon cancer screening.    She is requesting a mammogram as well.     Objective   Vital Signs:  /70 (BP Location: Right arm, Patient Position: Sitting, Cuff Size: Large Adult)   Pulse 75   Temp 97.1 °F (36.2 °C) (Temporal)   Ht 170.2 cm (67\")   Wt 110 kg (241 lb 12.8 oz)   SpO2 99%   BMI 37.87 kg/m²   Estimated body mass index is 37.87 kg/m² as calculated from the following:    Height as of this encounter: 170.2 cm (67\").    Weight as of this encounter: 110 kg (241 lb 12.8 oz).            Physical Exam  Constitutional:       Appearance: Normal appearance. She is obese.   HENT:      Head: Normocephalic.      Nose: Nose normal.   Eyes:      Extraocular Movements: Extraocular movements intact.      Pupils: Pupils are equal, round, and reactive to light.   Cardiovascular:      Rate and Rhythm: Normal rate and regular rhythm.      Heart sounds: Normal heart sounds.   Pulmonary:      Effort: Pulmonary effort is " normal.      Breath sounds: Normal breath sounds.   Musculoskeletal:         General: Normal range of motion.      Cervical back: Normal range of motion.   Skin:     General: Skin is warm and dry.   Neurological:      General: No focal deficit present.      Mental Status: She is alert and oriented to person, place, and time.   Psychiatric:         Mood and Affect: Mood normal.        Result Review :                Assessment and Plan   Diagnoses and all orders for this visit:    1. Essential hypertension (Primary)    2. Type 2 diabetes mellitus without complication, without long-term current use of insulin  -     Discontinue: SITagliptin (Januvia) 25 MG tablet; Take 1 tablet by mouth Daily.  Dispense: 90 tablet; Refill: 1  -     Hemoglobin A1c  -     Comprehensive metabolic panel  -     Lipid panel  -     TSH Rfx On Abnormal To Free T4  -     Amylase  -     Lipase  -     Discontinue: SITagliptin (Januvia) 25 MG tablet; Take 1 tablet by mouth Daily.  Dispense: 90 tablet; Refill: 1    3. Hyperlipidemia, unspecified hyperlipidemia type    4. Encounter for screening mammogram for malignant neoplasm of breast  -     Mammo Screening Digital Tomosynthesis Bilateral With CAD; Future    5. Screening for colon cancer  -     Ambulatory Referral to Gastroenterology; Future    6. Gastroesophageal reflux disease, unspecified whether esophagitis present  -     Ambulatory Referral to Gastroenterology; Future      Patient is going to call mail order pharmacy to confirm price of Januvia, if affordable she is going to start taking and stop the glipizide. She is to notify provider if she does end up switching medications.        Follow Up   Return in about 6 months (around 9/18/2025) for Next scheduled follow up.  Patient was given instructions and counseling regarding her condition or for health maintenance advice. Please see specific information pulled into the AVS if appropriate.

## 2025-03-19 LAB
ALBUMIN SERPL-MCNC: 4.6 G/DL (ref 3.5–5.2)
ALBUMIN/GLOB SERPL: 1.5 G/DL
ALP SERPL-CCNC: 64 U/L (ref 39–117)
ALT SERPL-CCNC: 56 U/L (ref 1–33)
AMYLASE SERPL-CCNC: 63 U/L (ref 28–100)
AST SERPL-CCNC: 35 U/L (ref 1–32)
BILIRUB SERPL-MCNC: 0.5 MG/DL (ref 0–1.2)
BUN SERPL-MCNC: 14 MG/DL (ref 6–20)
BUN/CREAT SERPL: 18.4 (ref 7–25)
CALCIUM SERPL-MCNC: 9.8 MG/DL (ref 8.6–10.5)
CHLORIDE SERPL-SCNC: 100 MMOL/L (ref 98–107)
CHOLEST SERPL-MCNC: 206 MG/DL (ref 0–200)
CO2 SERPL-SCNC: 26.7 MMOL/L (ref 22–29)
CREAT SERPL-MCNC: 0.76 MG/DL (ref 0.57–1)
EGFRCR SERPLBLD CKD-EPI 2021: 91 ML/MIN/1.73
GLOBULIN SER CALC-MCNC: 3 GM/DL
GLUCOSE SERPL-MCNC: 162 MG/DL (ref 65–99)
HBA1C MFR BLD: 8.3 % (ref 4.8–5.6)
HDLC SERPL-MCNC: 40 MG/DL (ref 40–60)
LDLC SERPL CALC-MCNC: 139 MG/DL (ref 0–100)
LIPASE SERPL-CCNC: 52 U/L (ref 13–60)
POTASSIUM SERPL-SCNC: 4.2 MMOL/L (ref 3.5–5.2)
PROT SERPL-MCNC: 7.6 G/DL (ref 6–8.5)
SODIUM SERPL-SCNC: 139 MMOL/L (ref 136–145)
TRIGL SERPL-MCNC: 148 MG/DL (ref 0–150)
TSH SERPL DL<=0.005 MIU/L-ACNC: 1.72 UIU/ML (ref 0.27–4.2)
VLDLC SERPL CALC-MCNC: 27 MG/DL (ref 5–40)

## 2025-04-09 ENCOUNTER — TELEPHONE (OUTPATIENT)
Dept: FAMILY MEDICINE CLINIC | Facility: CLINIC | Age: 60
End: 2025-04-09

## 2025-04-09 NOTE — TELEPHONE ENCOUNTER
"    Caller: Skylar Paige \"Skylar Paige\"    Relationship to patient: Self    Best call back number: [phone]      Patient is needing:     PATIENT IS REQUESTING A CALL BACK MANISHA CLAYTON REGARDING HER     SITagliptin (Januvia) 25 MG tablet   MEDICATION.     STATES SHE WAS INCREASED ON HER     SITagliptin (Januvia) 25 MG tablet   DOSAGE.     SHE STATES SHE HAS STARTED FEELING BAD AND IT DOESN'T SEEM TO BE HELPING HER.     PLEASE ADVISE.   "

## 2025-04-09 NOTE — TELEPHONE ENCOUNTER
Spoke w pt and she states that yesterday that she had severe nausea, dizziness, elevated hr and BP.  She states her glu had been much higher since changing medication to Januvia from Glipizide.  She states they are running 200s-230s in the am.  Please advise.    [facility] RMA

## 2025-04-10 NOTE — TELEPHONE ENCOUNTER
Spoke with patient and she wants to speak with you if possible because she's having side effects with the glipizide also, her blood sugar goes too low and doesn't know what she should do, Please call patient

## 2025-04-24 ENCOUNTER — TELEMEDICINE (OUTPATIENT)
Dept: FAMILY MEDICINE CLINIC | Facility: CLINIC | Age: 60
End: 2025-04-24
Payer: COMMERCIAL

## 2025-04-24 DIAGNOSIS — E11.9 TYPE 2 DIABETES MELLITUS WITHOUT COMPLICATION, WITHOUT LONG-TERM CURRENT USE OF INSULIN: Primary | ICD-10-CM

## 2025-04-24 NOTE — PROGRESS NOTES
Subjective   Skylar Paige is a 59 y.o. female.     No chief complaint on file.      History of Present Illness   Patient is recnetly changed from glipizide to januvia due to occasional low blood sugars. She states on the 25mg she was not getting low enough blood sugar overall so it was increased to 50mg and she is still not seeing much improvement with Januvia. She did stop the glipizide.       You have chosen to receive care through a telehealth visit.  Do you consent to use a video/audio connection for your medical care today? Yes  Video visit completed utilizing [employer].  Patient location home.  Physician location [facility].      The following portions of the patient's history were reviewed and updated as appropriate: allergies, current medications, past family history, past medical history, past social history, past surgical history and problem list.    Depression Screen:      3/18/2025     9:59 AM   PHQ-2/PHQ-9 Depression Screening   Little interest or pleasure in doing things Not at all   Feeling down, depressed, or hopeless Not at all   How difficult have these problems made it for you to do your work, take care of things at home, or get along with other people? 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     Pre-syncope     Right thyroid nodule     Solitary nodule of right lobe of thyroid     Thoracic back  pain     Thoracic disc disorder     Visit for routine gyn exam     Vitamin D deficiency        Past Surgical History:   Procedure Laterality Date    CHOLECYSTECTOMY      CHOLECYSTECTOMY      COLONOSCOPY      EPIDURAL BLOCK      TRIGGER POINT INJECTION  November 2022       Family History   Problem Relation Age of Onset    Cancer Mother     Heart disease Mother     Hypertension Mother     Thyroid disease Mother     Diabetes Paternal Grandmother     Heart disease Maternal Grandmother     Hypertension Maternal Grandmother     Diabetes Paternal Grandfather     Breast cancer Neg Hx        Social History     Socioeconomic History    Marital status:    Tobacco Use    Smoking status: Former     Current packs/day: 0.00     Average packs/day: 0.3 packs/day for 29.3 years (7.5 ttl pk-yrs)     Types: Cigarettes     Start date: 1/1/1990     Quit date: 1/1/2015     Years since quitting: 10.3    Smokeless tobacco: Never   Vaping Use    Vaping status: Never Used   Substance and Sexual Activity    Alcohol use: No    Drug use: No    Sexual activity: Not Currently     Birth control/protection: None       Current Outpatient Medications   Medication Sig Dispense Refill    amLODIPine (NORVASC) 5 MG tablet Take 1 tablet by mouth 2 (Two) Times a Day. 60 tablet 11    atorvastatin (LIPITOR) 20 MG tablet TAKE 1 TABLET BY MOUTH DAILY 90 tablet 2    cloNIDine (Catapres) 0.1 MG tablet Take 1 tablet by mouth Daily. 30 tablet 11    glucose blood test strip Test blood sugar once a day 100 each 12    glucose monitor monitoring kit 1 each Daily. Please dispense Accu-Chek glucometer or may substitute brand per formulary 1 each 0    metFORMIN (Glucophage) 1000 MG tablet TAKE 1 PILL PO Q AM  AND 1 1/2 PO  tablet 3    methocarbamol (ROBAXIN) 750 MG tablet Take 1 tablet by mouth 3 (Three) Times a Day As Needed for Muscle Spasms. 90 tablet 5    omeprazole (PrilOSEC) 20 MG capsule Take 1 capsule by mouth Daily. 30 capsule 11    SITagliptin  (Januvia) 50 MG tablet Take 1 tablet by mouth Daily. 90 tablet 1    valsartan-hydrochlorothiazide (DIOVAN-HCT) 320-25 MG per tablet Take 1 tablet by mouth Daily. 30 tablet 11     No current facility-administered medications for this visit.       Review of Systems    Objective   There were no vitals taken for this visit.    Physical Exam   Constitutional: She appears well-developed and well-nourished.   Pulmonary/Chest: Effort normal.   Neurological: She is alert.   Psychiatric: She has a normal mood and affect.       Recent Results (from the past 12 weeks)   Hemoglobin A1c    Collection Time: 03/18/25 11:13 AM    Specimen: Blood   Result Value Ref Range    Hemoglobin A1C 8.30 (H) 4.80 - 5.60 %   Comprehensive metabolic panel    Collection Time: 03/18/25 11:13 AM    Specimen: Blood   Result Value Ref Range    Glucose 162 (H) 65 - 99 mg/dL    BUN 14 6 - 20 mg/dL    Creatinine 0.76 0.57 - 1.00 mg/dL    EGFR Result 91.0 >60.0 mL/min/1.73    BUN/Creatinine Ratio 18.4 7.0 - 25.0    Sodium 139 136 - 145 mmol/L    Potassium 4.2 3.5 - 5.2 mmol/L    Chloride 100 98 - 107 mmol/L    Total CO2 26.7 22.0 - 29.0 mmol/L    Calcium 9.8 8.6 - 10.5 mg/dL    Total Protein 7.6 6.0 - 8.5 g/dL    Albumin 4.6 3.5 - 5.2 g/dL    Globulin 3.0 gm/dL    A/G Ratio 1.5 g/dL    Total Bilirubin 0.5 0.0 - 1.2 mg/dL    Alkaline Phosphatase 64 39 - 117 U/L    AST (SGOT) 35 (H) 1 - 32 U/L    ALT (SGPT) 56 (H) 1 - 33 U/L   Lipid panel    Collection Time: 03/18/25 11:13 AM    Specimen: Blood   Result Value Ref Range    Total Cholesterol 206 (H) 0 - 200 mg/dL    Triglycerides 148 0 - 150 mg/dL    HDL Cholesterol 40 40 - 60 mg/dL    VLDL Cholesterol Bahman 27 5 - 40 mg/dL    LDL Chol Calc (NIH) 139 (H) 0 - 100 mg/dL   TSH Rfx On Abnormal To Free T4    Collection Time: 03/18/25 11:13 AM    Specimen: Blood   Result Value Ref Range    TSH 1.720 0.270 - 4.200 uIU/mL   Amylase    Collection Time: 03/18/25 11:13 AM    Specimen: Blood   Result Value Ref Range     Amylase 63 28 - 100 U/L   Lipase    Collection Time: 03/18/25 11:13 AM    Specimen: Blood   Result Value Ref Range    Lipase 52 13 - 60 U/L     Assessment & Plan   Diagnoses and all orders for this visit:    1. Type 2 diabetes mellitus without complication, without long-term current use of insulin (Primary)  -     SITagliptin (Januvia) 50 MG tablet; Take 1 tablet by mouth Daily.  Dispense: 90 tablet; Refill: 1  -     Hemoglobin A1c; Future  -     Comprehensive metabolic panel; Future    Patient is amenable to januvia 50mg and recheck A1c around 6/18/25.      Video visit completed.       I spent 14 minutes caring for Skylar on this date of service. This time includes time spent by me in the following activities:obtaining and/or reviewing a separately obtained history, performing a medically appropriate examination and/or evaluation , ordering medications, tests, or procedures, and documenting information in the medical record

## 2025-05-06 ENCOUNTER — HOSPITAL ENCOUNTER (OUTPATIENT)
Dept: MAMMOGRAPHY | Facility: HOSPITAL | Age: 60
Discharge: HOME | End: 2025-05-06
Admitting: NURSE PRACTITIONER
Payer: COMMERCIAL

## 2025-05-06 DIAGNOSIS — Z12.31 ENCOUNTER FOR SCREENING MAMMOGRAM FOR MALIGNANT NEOPLASM OF BREAST: ICD-10-CM

## 2025-05-06 PROCEDURE — 77063 BREAST TOMOSYNTHESIS BI: CPT

## 2025-05-06 PROCEDURE — 77067 SCR MAMMO BI INCL CAD: CPT

## 2025-06-02 ENCOUNTER — TELEPHONE (OUTPATIENT)
Dept: GASTROENTEROLOGY | Facility: CLINIC | Age: 60
End: 2025-06-02
Payer: COMMERCIAL

## 2025-06-02 ENCOUNTER — OFFICE VISIT (OUTPATIENT)
Dept: GASTROENTEROLOGY | Facility: CLINIC | Age: 60
End: 2025-06-02
Payer: COMMERCIAL

## 2025-06-02 VITALS
DIASTOLIC BLOOD PRESSURE: 81 MMHG | TEMPERATURE: 97.5 F | BODY MASS INDEX: 36.29 KG/M2 | HEIGHT: 67 IN | WEIGHT: 231.2 LBS | HEART RATE: 78 BPM | SYSTOLIC BLOOD PRESSURE: 133 MMHG

## 2025-06-02 DIAGNOSIS — R12 HEARTBURN: ICD-10-CM

## 2025-06-02 DIAGNOSIS — Z12.11 SCREENING FOR COLON CANCER: ICD-10-CM

## 2025-06-02 DIAGNOSIS — R10.13 EPIGASTRIC PAIN: Primary | ICD-10-CM

## 2025-06-02 PROBLEM — K21.9 GASTROESOPHAGEAL REFLUX DISEASE: Status: ACTIVE | Noted: 2025-06-02

## 2025-06-02 PROCEDURE — 99204 OFFICE O/P NEW MOD 45 MIN: CPT | Performed by: PHYSICIAN ASSISTANT

## 2025-06-02 RX ORDER — KETOTIFEN FUMARATE 0.35 MG/ML
1 SOLUTION/ DROPS OPHTHALMIC
COMMUNITY

## 2025-06-02 NOTE — TELEPHONE ENCOUNTER
Advised that [facility] will call with final arrival time minimum 24 hrs before procedure. IF they do not get a phone call, arrival time will stay the same as given on instructions OK FOR THE HUB TO RELAY   COLON  06/30/2025

## 2025-06-02 NOTE — PROGRESS NOTES
"Chief Complaint  Heartburn    Subjective          History Of Present Illness:    Skylar Paige is a  59 y.o. female patient with history of type 2 diabetes mellitus, hypertension, hyperlipidemia who presents as a new patient for evaluation of heartburn and abdominal pain.    Patient reports she experiences intermittent heartburn symptoms. She is on omeprazole 20 mg daily. She reports some abdominal discomfort in the epigastric region. Patient describes it is sharp sometimes but often \"annoying\" pain. No obvious triggers.  She denies any dysphagia or odynophagia.  She can experience the pain in the mornings before breakfast. Patient denies nausea or vomiting. Patient has a prior CCY and with her Januvia she has loose stool after eating. Patient denies any black or bloody stools.     She cologuard 2020 was negative.     No family history of colon cancer.    Objective   Vital Signs:   /81 (BP Location: Right arm, Patient Position: Sitting, Cuff Size: Large Adult)   Pulse 78   Temp 97.5 °F (36.4 °C) (Temporal)   Ht 170.2 cm (67\")   Wt 105 kg (231 lb 3.2 oz)   BMI 36.21 kg/m²       Physical Exam  Vitals reviewed.   Constitutional:       General: She is not in acute distress.     Appearance: Normal appearance. She is not ill-appearing.   HENT:      Head: Normocephalic and atraumatic.      Nose: Nose normal.      Mouth/Throat:      Pharynx: Oropharynx is clear.   Eyes:      Conjunctiva/sclera: Conjunctivae normal.   Pulmonary:      Effort: Pulmonary effort is normal.   Abdominal:      General: There is no distension.      Palpations: Abdomen is soft. There is no mass.      Tenderness: There is no abdominal tenderness.   Musculoskeletal:         General: No swelling. Normal range of motion.      Cervical back: Normal range of motion.   Skin:     General: Skin is warm and dry.      Findings: No bruising or rash.   Neurological:      General: No focal deficit present.      Mental Status: She is alert and " oriented to person, place, and time.      Motor: No weakness.      Gait: Gait normal.   Psychiatric:         Mood and Affect: Mood normal.          Result Review :   The following data was reviewed by: Angela Casas PA-C on 06/02/2025:  CMP          9/17/2024    11:12 3/18/2025    11:13   CMP   Glucose 183  162    BUN 16  14    Creatinine 0.74  0.76    EGFR 94  91.0    Sodium 138  139    Potassium 4.1  4.2    Chloride 100  100    Calcium 9.8  9.8    Total Protein 7.7  7.6    Albumin 4.6  4.6    Globulin 3.1  3.0    Total Bilirubin 0.3  0.5    Alkaline Phosphatase 62  64    AST (SGOT) 32  35    ALT (SGPT) 52  56    Albumin/Globulin Ratio  1.5    BUN/Creatinine Ratio 22  18.4      CBC          9/17/2024    11:12   CBC   WBC 5.4    RBC 4.87    Hemoglobin 14.6    Hematocrit 44.7    MCV 92    MCH 30.0    MCHC 32.7    RDW 12.6    Platelets 205            Assessment and Plan    Diagnoses and all orders for this visit:    1. Epigastric pain (Primary)  -     Ambulatory Referral to Gastroenterology  -     Case Request; Standing  -     Follow Anesthesia Guidelines / Protocol; Future  -     Case Request    2. Heartburn  -     Ambulatory Referral to Gastroenterology  -     Case Request; Standing  -     Implement Anesthesia orders day of procedure.; Standing  -     Follow Anesthesia Guidelines / Protocol; Future  -     Verify bowel prep was successful; Standing  -     Give tap water enema if bowel prep was insufficient; Standing  -     Obtain Informed Consent; Standing  -     Case Request    3. Screening for colon cancer  -     Ambulatory Referral to Gastroenterology  -     Case Request; Standing  -     Implement Anesthesia orders day of procedure.; Standing  -     Follow Anesthesia Guidelines / Protocol; Future  -     Verify bowel prep was successful; Standing  -     Give tap water enema if bowel prep was insufficient; Standing  -     Obtain Informed Consent; Standing  -     Case Request       I recommend proceeding with  upper endoscopy to rule out gastritis, esophagitis, AVM, peptic ulcer disease, Alvarado's esophagus, hiatal hernia, H. pylori infection, celiac disease, EOE or mass/malignancy. Risks (including, but not limited to perforation, bleeding, sedation-related complications, and death), benefits, and alternatives to the procedure were discussed with the patient and all questions were answered. Patient is agreeable to plan of care.   Colonoscopy at the same time for screening purposes.  Continue PPI therapy       Follow Up   Return for EGD/Colonoscopy.    Dragon dictation used throughout this note.            Angela Pak PA-C   [facility]  [address] [place] [address]  Office: [phone]

## 2025-06-30 ENCOUNTER — LAB REQUISITION (OUTPATIENT)
Dept: LAB | Facility: HOSPITAL | Age: 60
End: 2025-06-30
Payer: COMMERCIAL

## 2025-06-30 ENCOUNTER — OUTSIDE FACILITY SERVICE (OUTPATIENT)
Dept: GASTROENTEROLOGY | Facility: CLINIC | Age: 60
End: 2025-06-30
Payer: COMMERCIAL

## 2025-06-30 DIAGNOSIS — D12.2 BENIGN NEOPLASM OF ASCENDING COLON: ICD-10-CM

## 2025-06-30 DIAGNOSIS — K44.9 DIAPHRAGMATIC HERNIA WITHOUT OBSTRUCTION OR GANGRENE: ICD-10-CM

## 2025-06-30 DIAGNOSIS — R12 HEARTBURN: ICD-10-CM

## 2025-06-30 DIAGNOSIS — K21.00 GASTRO-ESOPHAGEAL REFLUX DISEASE WITH ESOPHAGITIS, WITHOUT BLEEDING: ICD-10-CM

## 2025-06-30 DIAGNOSIS — K29.60 OTHER GASTRITIS WITHOUT BLEEDING: ICD-10-CM

## 2025-06-30 DIAGNOSIS — D12.5 BENIGN NEOPLASM OF SIGMOID COLON: ICD-10-CM

## 2025-06-30 DIAGNOSIS — Z12.11 ENCOUNTER FOR SCREENING FOR MALIGNANT NEOPLASM OF COLON: ICD-10-CM

## 2025-06-30 DIAGNOSIS — K57.30 DIVERTICULOSIS OF LARGE INTESTINE WITHOUT PERFORATION OR ABSCESS WITHOUT BLEEDING: ICD-10-CM

## 2025-06-30 DIAGNOSIS — K64.0 FIRST DEGREE HEMORRHOIDS: ICD-10-CM

## 2025-06-30 DIAGNOSIS — D12.3 BENIGN NEOPLASM OF TRANSVERSE COLON: ICD-10-CM

## 2025-06-30 PROCEDURE — 88342 IMHCHEM/IMCYTCHM 1ST ANTB: CPT | Performed by: INTERNAL MEDICINE

## 2025-06-30 PROCEDURE — 88305 TISSUE EXAM BY PATHOLOGIST: CPT | Performed by: INTERNAL MEDICINE

## 2025-06-30 RX ORDER — PANTOPRAZOLE SODIUM 40 MG/1
40 TABLET, DELAYED RELEASE ORAL DAILY
Qty: 30 TABLET | Refills: 2 | Status: SHIPPED | OUTPATIENT
Start: 2025-06-30 | End: 2025-07-07 | Stop reason: SDUPTHER

## 2025-07-03 LAB
CYTO UR: NORMAL
LAB AP CASE REPORT: NORMAL
LAB AP CLINICAL INFORMATION: NORMAL
LAB AP SPECIAL STAINS: NORMAL
PATH REPORT.FINAL DX SPEC: NORMAL
PATH REPORT.GROSS SPEC: NORMAL

## 2025-07-15 ENCOUNTER — OFFICE VISIT (OUTPATIENT)
Dept: FAMILY MEDICINE CLINIC | Facility: CLINIC | Age: 60
End: 2025-07-15
Payer: COMMERCIAL

## 2025-07-15 VITALS
DIASTOLIC BLOOD PRESSURE: 70 MMHG | BODY MASS INDEX: 35.79 KG/M2 | HEIGHT: 67 IN | TEMPERATURE: 97.3 F | WEIGHT: 228 LBS | SYSTOLIC BLOOD PRESSURE: 138 MMHG | HEART RATE: 91 BPM | OXYGEN SATURATION: 98 %

## 2025-07-15 DIAGNOSIS — M54.6 THORACIC SPINE PAIN: ICD-10-CM

## 2025-07-15 DIAGNOSIS — F40.240 CLAUSTROPHOBIA: ICD-10-CM

## 2025-07-15 DIAGNOSIS — E11.9 TYPE 2 DIABETES MELLITUS WITHOUT COMPLICATION, WITHOUT LONG-TERM CURRENT USE OF INSULIN: Primary | ICD-10-CM

## 2025-07-15 DIAGNOSIS — N30.00 ACUTE CYSTITIS WITHOUT HEMATURIA: ICD-10-CM

## 2025-07-15 PROCEDURE — 99214 OFFICE O/P EST MOD 30 MIN: CPT | Performed by: NURSE PRACTITIONER

## 2025-07-15 RX ORDER — ALPRAZOLAM 0.25 MG
0.25 TABLET ORAL DAILY PRN
Qty: 2 TABLET | Refills: 0 | Status: SHIPPED | OUTPATIENT
Start: 2025-07-15

## 2025-07-15 NOTE — PROGRESS NOTES
"Chief Complaint  discuss diabetes medications    Subjective        Skylar Paige presents to [facility]  Diabetes  Diabetes type:  Type 2  MedicAlert ID: no    Eye exam current: yes    Sees podiatrist: no      Visit observed and assisted by Jatin Hills, [employer] student.    Patient is here to discuss her diabetic medications. She states her blood sugars continue to rise despite taking januvia 50mg. She is also wanting to not increase dose as she thinks she is having side effects from the medicines, including weakness and nausea. She is amenable to referral to endocrinology but is wanting to stop the Januvia for now and repeat A1c in 3 months.     She continues to have midback pain constantly and has a history of seeing pain management and neurosurgeon in the past for neck as well. She is requesting new imaging on her thoracic spine. She is very claustrophobic so she would like an open MRI if possible.    She reports she had recent UTI symptoms and took ciprofloxacin she had at home. Denies symptoms today.   Objective   Vital Signs:  /70 (BP Location: Left arm, Patient Position: Sitting, Cuff Size: Large Adult)   Pulse 91   Temp 97.3 °F (36.3 °C) (Temporal)   Ht 170.2 cm (67\")   Wt 103 kg (228 lb)   SpO2 98%   BMI 35.71 kg/m²   Estimated body mass index is 35.71 kg/m² as calculated from the following:    Height as of this encounter: 170.2 cm (67\").    Weight as of this encounter: 103 kg (228 lb).            Physical Exam  Constitutional:       Appearance: Normal appearance. She is obese.   HENT:      Head: Normocephalic.      Nose: Nose normal.   Eyes:      Extraocular Movements: Extraocular movements intact.      Pupils: Pupils are equal, round, and reactive to light.   Cardiovascular:      Rate and Rhythm: Normal rate and regular rhythm.      Heart sounds: Normal heart sounds.   Pulmonary:      Effort: Pulmonary effort is normal.      Breath sounds: Normal breath sounds. "   Musculoskeletal:         General: Normal range of motion.      Cervical back: Normal range of motion.   Skin:     General: Skin is warm and dry.   Neurological:      General: No focal deficit present.      Mental Status: She is alert and oriented to person, place, and time.   Psychiatric:         Mood and Affect: Mood normal.        Result Review :                Assessment and Plan   Diagnoses and all orders for this visit:    1. Type 2 diabetes mellitus without complication, without long-term current use of insulin (Primary)  -     Hemoglobin A1c; Future  -     Ambulatory Referral to Endocrinology; Future    2. Thoracic spine pain  -     MRI Thoracic Spine Without Contrast; Future    3. Claustrophobia  -     ALPRAZolam (Xanax) 0.25 MG tablet; Take 1 tablet by mouth Daily As Needed for Anxiety.  Dispense: 2 tablet; Refill: 0    4. Acute cystitis without hematuria    Patient encouraged to follow up if urinary symptoms return.          Follow Up   Return in about 3 months (around 10/15/2025) for Next scheduled follow up.  Patient was given instructions and counseling regarding her condition or for health maintenance advice. Please see specific information pulled into the AVS if appropriate.

## 2025-08-25 ENCOUNTER — OFFICE VISIT (OUTPATIENT)
Dept: ENDOCRINOLOGY | Age: 60
End: 2025-08-25
Payer: COMMERCIAL

## 2025-08-25 VITALS
HEIGHT: 67 IN | BODY MASS INDEX: 35.85 KG/M2 | WEIGHT: 228.4 LBS | SYSTOLIC BLOOD PRESSURE: 132 MMHG | OXYGEN SATURATION: 98 % | DIASTOLIC BLOOD PRESSURE: 78 MMHG | HEART RATE: 83 BPM

## 2025-08-25 DIAGNOSIS — E78.2 MIXED HYPERLIPIDEMIA: ICD-10-CM

## 2025-08-25 DIAGNOSIS — E11.65 TYPE 2 DIABETES MELLITUS WITH HYPERGLYCEMIA, WITHOUT LONG-TERM CURRENT USE OF INSULIN: Primary | ICD-10-CM

## 2025-08-25 DIAGNOSIS — I10 ESSENTIAL HYPERTENSION: ICD-10-CM

## 2025-08-25 PROCEDURE — 99215 OFFICE O/P EST HI 40 MIN: CPT | Performed by: NURSE PRACTITIONER

## 2025-08-25 RX ORDER — SEMAGLUTIDE 0.68 MG/ML
.25-.5 INJECTION, SOLUTION SUBCUTANEOUS WEEKLY
Qty: 9 ML | Refills: 1 | Status: SHIPPED | OUTPATIENT
Start: 2025-08-25

## 2025-08-28 ENCOUNTER — PATIENT ROUNDING (BHMG ONLY) (OUTPATIENT)
Dept: ENDOCRINOLOGY | Age: 60
End: 2025-08-28
Payer: COMMERCIAL